# Patient Record
Sex: FEMALE | Race: AMERICAN INDIAN OR ALASKA NATIVE | NOT HISPANIC OR LATINO | URBAN - METROPOLITAN AREA
[De-identification: names, ages, dates, MRNs, and addresses within clinical notes are randomized per-mention and may not be internally consistent; named-entity substitution may affect disease eponyms.]

---

## 2017-07-10 ENCOUNTER — OUTPATIENT (OUTPATIENT)
Dept: OUTPATIENT SERVICES | Facility: HOSPITAL | Age: 77
LOS: 1 days | Discharge: HOME | End: 2017-07-10

## 2017-07-10 DIAGNOSIS — M25.519 PAIN IN UNSPECIFIED SHOULDER: ICD-10-CM

## 2017-07-10 DIAGNOSIS — K02.52 DENTAL CARIES ON PIT AND FISSURE SURFACE PENETRATING INTO DENTIN: ICD-10-CM

## 2017-07-10 DIAGNOSIS — E78.00 PURE HYPERCHOLESTEROLEMIA, UNSPECIFIED: ICD-10-CM

## 2017-09-06 ENCOUNTER — OUTPATIENT (OUTPATIENT)
Dept: OUTPATIENT SERVICES | Facility: HOSPITAL | Age: 77
LOS: 1 days | Discharge: HOME | End: 2017-09-06

## 2017-09-06 DIAGNOSIS — E78.00 PURE HYPERCHOLESTEROLEMIA, UNSPECIFIED: ICD-10-CM

## 2017-09-06 DIAGNOSIS — Z98.818 OTHER DENTAL PROCEDURE STATUS: ICD-10-CM

## 2017-09-06 DIAGNOSIS — M25.519 PAIN IN UNSPECIFIED SHOULDER: ICD-10-CM

## 2017-09-29 ENCOUNTER — OUTPATIENT (OUTPATIENT)
Dept: OUTPATIENT SERVICES | Facility: HOSPITAL | Age: 77
LOS: 1 days | Discharge: HOME | End: 2017-09-29

## 2017-09-29 DIAGNOSIS — E78.00 PURE HYPERCHOLESTEROLEMIA, UNSPECIFIED: ICD-10-CM

## 2017-09-29 DIAGNOSIS — M25.519 PAIN IN UNSPECIFIED SHOULDER: ICD-10-CM

## 2017-10-19 ENCOUNTER — OUTPATIENT (OUTPATIENT)
Dept: OUTPATIENT SERVICES | Facility: HOSPITAL | Age: 77
LOS: 1 days | Discharge: HOME | End: 2017-10-19

## 2017-10-19 DIAGNOSIS — E78.00 PURE HYPERCHOLESTEROLEMIA, UNSPECIFIED: ICD-10-CM

## 2017-10-19 DIAGNOSIS — Z01.21 ENCOUNTER FOR DENTAL EXAMINATION AND CLEANING WITH ABNORMAL FINDINGS: ICD-10-CM

## 2017-10-19 DIAGNOSIS — M25.519 PAIN IN UNSPECIFIED SHOULDER: ICD-10-CM

## 2017-10-23 ENCOUNTER — OUTPATIENT (OUTPATIENT)
Dept: OUTPATIENT SERVICES | Facility: HOSPITAL | Age: 77
LOS: 1 days | Discharge: HOME | End: 2017-10-23

## 2017-10-23 DIAGNOSIS — K05.4 PERIODONTOSIS: ICD-10-CM

## 2017-10-23 DIAGNOSIS — M25.519 PAIN IN UNSPECIFIED SHOULDER: ICD-10-CM

## 2017-10-23 DIAGNOSIS — E78.00 PURE HYPERCHOLESTEROLEMIA, UNSPECIFIED: ICD-10-CM

## 2017-11-01 ENCOUNTER — OUTPATIENT (OUTPATIENT)
Dept: OUTPATIENT SERVICES | Facility: HOSPITAL | Age: 77
LOS: 1 days | Discharge: HOME | End: 2017-11-01

## 2017-11-01 DIAGNOSIS — M25.519 PAIN IN UNSPECIFIED SHOULDER: ICD-10-CM

## 2017-11-01 DIAGNOSIS — R91.8 OTHER NONSPECIFIC ABNORMAL FINDING OF LUNG FIELD: ICD-10-CM

## 2017-11-01 DIAGNOSIS — E78.00 PURE HYPERCHOLESTEROLEMIA, UNSPECIFIED: ICD-10-CM

## 2018-03-10 ENCOUNTER — OUTPATIENT (OUTPATIENT)
Dept: OUTPATIENT SERVICES | Facility: HOSPITAL | Age: 78
LOS: 1 days | Discharge: HOME | End: 2018-03-10

## 2018-03-10 DIAGNOSIS — R91.8 OTHER NONSPECIFIC ABNORMAL FINDING OF LUNG FIELD: ICD-10-CM

## 2019-05-31 ENCOUNTER — OUTPATIENT (OUTPATIENT)
Dept: OUTPATIENT SERVICES | Facility: HOSPITAL | Age: 79
LOS: 1 days | Discharge: HOME | End: 2019-05-31
Payer: MEDICARE

## 2019-05-31 DIAGNOSIS — R13.10 DYSPHAGIA, UNSPECIFIED: ICD-10-CM

## 2019-05-31 PROCEDURE — 74247: CPT | Mod: 26

## 2019-06-21 ENCOUNTER — OUTPATIENT (OUTPATIENT)
Dept: OUTPATIENT SERVICES | Facility: HOSPITAL | Age: 79
LOS: 1 days | Discharge: HOME | End: 2019-06-21

## 2019-06-21 DIAGNOSIS — R13.10 DYSPHAGIA, UNSPECIFIED: ICD-10-CM

## 2019-08-02 ENCOUNTER — OUTPATIENT (OUTPATIENT)
Dept: OUTPATIENT SERVICES | Facility: HOSPITAL | Age: 79
LOS: 1 days | Discharge: HOME | End: 2019-08-02

## 2019-08-14 DIAGNOSIS — K02.52 DENTAL CARIES ON PIT AND FISSURE SURFACE PENETRATING INTO DENTIN: ICD-10-CM

## 2019-08-16 ENCOUNTER — OUTPATIENT (OUTPATIENT)
Dept: OUTPATIENT SERVICES | Facility: HOSPITAL | Age: 79
LOS: 1 days | Discharge: HOME | End: 2019-08-16
Payer: MEDICARE

## 2019-08-16 PROCEDURE — 92012 INTRM OPH EXAM EST PATIENT: CPT

## 2019-09-20 ENCOUNTER — OUTPATIENT (OUTPATIENT)
Dept: OUTPATIENT SERVICES | Facility: HOSPITAL | Age: 79
LOS: 1 days | Discharge: HOME | End: 2019-09-20
Payer: MEDICARE

## 2019-09-20 PROCEDURE — 92014 COMPRE OPH EXAM EST PT 1/>: CPT

## 2019-10-09 ENCOUNTER — OUTPATIENT (OUTPATIENT)
Dept: OUTPATIENT SERVICES | Facility: HOSPITAL | Age: 79
LOS: 1 days | Discharge: HOME | End: 2019-10-09

## 2019-10-09 ENCOUNTER — APPOINTMENT (OUTPATIENT)
Dept: ENDOCRINOLOGY | Facility: CLINIC | Age: 79
End: 2019-10-09

## 2019-10-09 VITALS
TEMPERATURE: 96.1 F | WEIGHT: 115 LBS | DIASTOLIC BLOOD PRESSURE: 80 MMHG | BODY MASS INDEX: 21.16 KG/M2 | HEIGHT: 62 IN | HEART RATE: 69 BPM | SYSTOLIC BLOOD PRESSURE: 155 MMHG

## 2019-10-09 DIAGNOSIS — Z78.9 OTHER SPECIFIED HEALTH STATUS: ICD-10-CM

## 2019-10-09 DIAGNOSIS — R63.4 ABNORMAL WEIGHT LOSS: ICD-10-CM

## 2019-10-09 NOTE — PHYSICAL EXAM
[Alert] : alert [No Acute Distress] : no acute distress [Well Nourished] : well nourished [Well Developed] : well developed [EOMI] : extra ocular movement intact [Normal Sclera/Conjunctiva] : normal sclera/conjunctiva [No Proptosis] : no proptosis [Normal Oropharynx] : the oropharynx was normal [Thyroid Not Enlarged] : the thyroid was not enlarged [No Thyroid Nodules] : there were no palpable thyroid nodules [Clear to Auscultation] : lungs were clear to auscultation bilaterally [No Accessory Muscle Use] : no accessory muscle use [No Respiratory Distress] : no respiratory distress [Normal S1, S2] : normal S1 and S2 [Regular Rhythm] : with a regular rhythm [Normal Rate] : heart rate was normal  [No Edema] : there was no peripheral edema [Pedal Pulses Normal] : the pedal pulses are present [Not Tender] : non-tender [Normal Bowel Sounds] : normal bowel sounds [Not Distended] : not distended [Soft] : abdomen soft [Post Cervical Nodes] : posterior cervical nodes [Normal] : normal and non tender [Anterior Cervical Nodes] : anterior cervical nodes [Axillary Nodes] : axillary nodes [Spine Straight] : spine straight [No Spinal Tenderness] : no spinal tenderness [Normal Gait] : normal gait [No Stigmata of Cushings Syndrome] : no stigmata of cushings syndrome [Normal Strength/Tone] : muscle strength and tone were normal [No Rash] : no rash [Acanthosis Nigricans] : no acanthosis nigricans [No Tremors] : no tremors [Normal Reflexes] : deep tendon reflexes were 2+ and symmetric [Oriented x3] : oriented to person, place, and time

## 2019-10-11 ENCOUNTER — OUTPATIENT (OUTPATIENT)
Dept: OUTPATIENT SERVICES | Facility: HOSPITAL | Age: 79
LOS: 1 days | Discharge: HOME | End: 2019-10-11
Payer: MEDICARE

## 2019-10-11 PROCEDURE — 92012 INTRM OPH EXAM EST PATIENT: CPT

## 2019-11-07 ENCOUNTER — OUTPATIENT (OUTPATIENT)
Dept: OUTPATIENT SERVICES | Facility: HOSPITAL | Age: 79
LOS: 1 days | Discharge: HOME | End: 2019-11-07

## 2019-11-07 DIAGNOSIS — Z98.818 OTHER DENTAL PROCEDURE STATUS: ICD-10-CM

## 2019-11-22 ENCOUNTER — OUTPATIENT (OUTPATIENT)
Dept: OUTPATIENT SERVICES | Facility: HOSPITAL | Age: 79
LOS: 1 days | Discharge: HOME | End: 2019-11-22
Payer: MEDICARE

## 2019-11-22 PROCEDURE — 92012 INTRM OPH EXAM EST PATIENT: CPT

## 2020-01-02 ENCOUNTER — OUTPATIENT (OUTPATIENT)
Dept: OUTPATIENT SERVICES | Facility: HOSPITAL | Age: 80
LOS: 1 days | Discharge: HOME | End: 2020-01-02
Payer: MEDICARE

## 2020-01-02 PROCEDURE — 92012 INTRM OPH EXAM EST PATIENT: CPT

## 2020-08-27 ENCOUNTER — OUTPATIENT (OUTPATIENT)
Dept: OUTPATIENT SERVICES | Facility: HOSPITAL | Age: 80
LOS: 1 days | Discharge: HOME | End: 2020-08-27
Payer: MEDICARE

## 2020-08-27 PROCEDURE — 92014 COMPRE OPH EXAM EST PT 1/>: CPT

## 2020-08-27 PROCEDURE — 92134 CPTRZ OPH DX IMG PST SGM RTA: CPT | Mod: 26

## 2020-08-31 DIAGNOSIS — H35.89 OTHER SPECIFIED RETINAL DISORDERS: ICD-10-CM

## 2020-08-31 DIAGNOSIS — H04.123 DRY EYE SYNDROME OF BILATERAL LACRIMAL GLANDS: ICD-10-CM

## 2020-08-31 DIAGNOSIS — H25.10 AGE-RELATED NUCLEAR CATARACT, UNSPECIFIED EYE: ICD-10-CM

## 2021-03-10 ENCOUNTER — FORM ENCOUNTER (OUTPATIENT)
Age: 81
End: 2021-03-10

## 2021-03-11 ENCOUNTER — TRANSCRIPTION ENCOUNTER (OUTPATIENT)
Age: 81
End: 2021-03-11

## 2021-03-12 ENCOUNTER — OUTPATIENT (OUTPATIENT)
Dept: INPATIENT UNIT | Facility: HOSPITAL | Age: 81
LOS: 1 days | Discharge: HOME | End: 2021-03-12

## 2021-03-12 ENCOUNTER — APPOINTMENT (OUTPATIENT)
Dept: DISASTER EMERGENCY | Facility: CLINIC | Age: 81
End: 2021-03-12

## 2021-03-12 VITALS
HEART RATE: 70 BPM | DIASTOLIC BLOOD PRESSURE: 70 MMHG | OXYGEN SATURATION: 98 % | SYSTOLIC BLOOD PRESSURE: 175 MMHG | TEMPERATURE: 98 F | RESPIRATION RATE: 19 BRPM

## 2021-03-12 VITALS
SYSTOLIC BLOOD PRESSURE: 145 MMHG | OXYGEN SATURATION: 98 % | DIASTOLIC BLOOD PRESSURE: 65 MMHG | TEMPERATURE: 99 F | RESPIRATION RATE: 19 BRPM | HEART RATE: 66 BPM

## 2021-03-12 DIAGNOSIS — U07.1 COVID-19: ICD-10-CM

## 2021-03-12 RX ORDER — SODIUM CHLORIDE 9 MG/ML
250 INJECTION INTRAMUSCULAR; INTRAVENOUS; SUBCUTANEOUS
Refills: 0 | Status: COMPLETED | OUTPATIENT
Start: 2021-03-12 | End: 2021-03-12

## 2021-03-12 RX ORDER — BAMLANIVIMAB 35 MG/ML
700 INJECTION, SOLUTION INTRAVENOUS ONCE
Refills: 0 | Status: COMPLETED | OUTPATIENT
Start: 2021-03-12 | End: 2021-03-12

## 2021-03-12 RX ADMIN — BAMLANIVIMAB 270 MILLIGRAM(S): 35 INJECTION, SOLUTION INTRAVENOUS at 09:00

## 2021-03-12 RX ADMIN — SODIUM CHLORIDE 25 MILLILITER(S): 9 INJECTION INTRAMUSCULAR; INTRAVENOUS; SUBCUTANEOUS at 11:04

## 2021-03-12 NOTE — CHART NOTE - NSCHARTNOTEFT_GEN_A_CORE
CC: Monoclonal Antibody Infusion/COVID 19 Positive  80yFemale    Pt is a 81 yo female, COVID + 3/7. Symptoms include loss of appetite, lower back pain, and chills.    PMH: HTN, HLD    MEDS: metoprolol, simvastatin, ASA 81, VIT C/D    ALLERGIES:  PCN    HT/WT: 5'1" 106 lbs    exam/findings:  T(C): --  HR: --  BP: --  RR: --  SpO2: --      PE:   Appearance: NAD	  HEENT:   Normal oral mucosa,   Lymphatic: No lymphadenopathy  Cardiovascular: Normal S1 S2, No JVD, No murmurs, No edema  Respiratory: Lungs clear to auscultation	  Gastrointestinal:  Soft, Non-tender, + BS	  Skin: warm and dry  Neurologic: Non-focal  Extremities: Normal range of motion,    ASSESSMENT:  Pt is a 81 yo female, Covid +3/7  referred by Dr. Farris who presents to infusion center for Monoclonal antibody infusion (Bamlanivimab)  Symptoms/ Criteria: loss of appetite, lower back pain, chills  Risk Profile includes: age over 65, HTN     PLAN:  - infusion procedure explained to patient   - Consent for monoclonal antibody infusion obtained   - Risk & benefits discussed/all questions answered   - infuse  Bamlanivimab 700mg  IV over one hour   - observe patient for one hour post infusion   - pt was discharged in stable condition.  - pt tolerated the procedure well.  - pt was instructed to continue quarantine and hand hygiene  - pt was instructed to go to the emergency department if they experience difficulty breathing, shortness of breath, fever over 100.4 and/or pulse ox under 94%    Jillian D'Amico, PA-C CC: Monoclonal Antibody Infusion/COVID 19 Positive  80yFemale    Pt is a 79 yo female, COVID + 3/7. Symptoms include loss of appetite, lower back pain, and chills.    PMH: HTN, HLD    MEDS: metoprolol, simvastatin, ASA 81, VIT C/D    ALLERGIES:  PCN    HT/WT: 5'1" 106 lbs    exam/findings:  Vital Signs Last 24 Hrs  T(C): 36.8 (12 Mar 2021 09:00), Max: 36.8 (12 Mar 2021 09:00)  T(F): 98.2 (12 Mar 2021 09:00), Max: 98.2 (12 Mar 2021 09:00)  HR: 70 (12 Mar 2021 09:00) (70 - 70)  BP: 175/70 (12 Mar 2021 09:00) (175/70 - 175/70)  BP(mean): --  RR: 19 (12 Mar 2021 09:00) (19 - 19)  SpO2: 98% (12 Mar 2021 09:00) (98% - 98%)    PE:   Appearance: NAD	  HEENT:   Normal oral mucosa,   Lymphatic: No lymphadenopathy  Cardiovascular: Normal S1 S2, No JVD, No murmurs, No edema  Respiratory: Lungs clear to auscultation	  Gastrointestinal:  Soft, Non-tender, + BS	  Skin: warm and dry  Neurologic: Non-focal  Extremities: Normal range of motion,    ASSESSMENT:  Pt is a 79 yo female, Covid +3/7  referred by Dr. Farris who presents to infusion center for Monoclonal antibody infusion (Bamlanivimab)  Symptoms/ Criteria: loss of appetite, lower back pain, chills  Risk Profile includes: age over 65, HTN     PLAN:  - infusion procedure explained to patient   - Consent for monoclonal antibody infusion obtained   - Risk & benefits discussed/all questions answered   - infuse  Bamlanivimab 700mg  IV over one hour   - observe patient for one hour post infusion   - pt was discharged in stable condition.  - pt tolerated the procedure well.  - pt was instructed to continue quarantine and hand hygiene  - pt was instructed to go to the emergency department if they experience difficulty breathing, shortness of breath, fever over 100.4 and/or pulse ox under 94%    Jillian D'Amico, PA-C

## 2021-03-13 ENCOUNTER — TRANSCRIPTION ENCOUNTER (OUTPATIENT)
Age: 81
End: 2021-03-13

## 2021-11-29 ENCOUNTER — APPOINTMENT (OUTPATIENT)
Dept: OPHTHALMOLOGY | Facility: CLINIC | Age: 81
End: 2021-11-29

## 2021-11-29 ENCOUNTER — OUTPATIENT (OUTPATIENT)
Dept: OUTPATIENT SERVICES | Facility: HOSPITAL | Age: 81
LOS: 1 days | Discharge: HOME | End: 2021-11-29
Payer: MEDICARE

## 2021-11-29 PROCEDURE — 92014 COMPRE OPH EXAM EST PT 1/>: CPT

## 2021-12-01 DIAGNOSIS — H35.89 OTHER SPECIFIED RETINAL DISORDERS: ICD-10-CM

## 2021-12-01 DIAGNOSIS — H25.10 AGE-RELATED NUCLEAR CATARACT, UNSPECIFIED EYE: ICD-10-CM

## 2021-12-01 DIAGNOSIS — H04.123 DRY EYE SYNDROME OF BILATERAL LACRIMAL GLANDS: ICD-10-CM

## 2022-01-20 ENCOUNTER — OUTPATIENT (OUTPATIENT)
Dept: OUTPATIENT SERVICES | Facility: HOSPITAL | Age: 82
LOS: 1 days | Discharge: HOME | End: 2022-01-20

## 2022-05-24 ENCOUNTER — OUTPATIENT (OUTPATIENT)
Dept: OUTPATIENT SERVICES | Facility: HOSPITAL | Age: 82
LOS: 1 days | Discharge: HOME | End: 2022-05-24
Payer: MEDICARE

## 2022-05-24 DIAGNOSIS — M54.16 RADICULOPATHY, LUMBAR REGION: ICD-10-CM

## 2022-05-24 DIAGNOSIS — M54.9 DORSALGIA, UNSPECIFIED: ICD-10-CM

## 2022-05-24 PROCEDURE — 72110 X-RAY EXAM L-2 SPINE 4/>VWS: CPT | Mod: 26

## 2022-07-06 ENCOUNTER — OUTPATIENT (OUTPATIENT)
Dept: OUTPATIENT SERVICES | Facility: HOSPITAL | Age: 82
LOS: 1 days | Discharge: HOME | End: 2022-07-06

## 2022-07-06 DIAGNOSIS — M54.89 OTHER DORSALGIA: ICD-10-CM

## 2022-07-06 DIAGNOSIS — M25.511 PAIN IN RIGHT SHOULDER: ICD-10-CM

## 2022-07-06 DIAGNOSIS — M54.16 RADICULOPATHY, LUMBAR REGION: ICD-10-CM

## 2022-07-14 ENCOUNTER — INPATIENT (INPATIENT)
Facility: HOSPITAL | Age: 82
LOS: 3 days | Discharge: HOME HEALTH | End: 2022-07-18
Attending: HOSPITALIST | Admitting: HOSPITALIST

## 2022-07-14 VITALS
TEMPERATURE: 98 F | OXYGEN SATURATION: 98 % | HEART RATE: 74 BPM | SYSTOLIC BLOOD PRESSURE: 169 MMHG | DIASTOLIC BLOOD PRESSURE: 79 MMHG | RESPIRATION RATE: 20 BRPM

## 2022-07-14 LAB
ALBUMIN SERPL ELPH-MCNC: 4.2 G/DL — SIGNIFICANT CHANGE UP (ref 3.5–5.2)
ALP SERPL-CCNC: 89 U/L — SIGNIFICANT CHANGE UP (ref 30–115)
ALT FLD-CCNC: 22 U/L — SIGNIFICANT CHANGE UP (ref 0–41)
ANION GAP SERPL CALC-SCNC: 11 MMOL/L — SIGNIFICANT CHANGE UP (ref 7–14)
ANION GAP SERPL CALC-SCNC: 13 MMOL/L — SIGNIFICANT CHANGE UP (ref 7–14)
ANION GAP SERPL CALC-SCNC: 13 MMOL/L — SIGNIFICANT CHANGE UP (ref 7–14)
APPEARANCE UR: CLEAR — SIGNIFICANT CHANGE UP
AST SERPL-CCNC: 25 U/L — SIGNIFICANT CHANGE UP (ref 0–41)
BASOPHILS # BLD AUTO: 0.03 K/UL — SIGNIFICANT CHANGE UP (ref 0–0.2)
BASOPHILS NFR BLD AUTO: 0.3 % — SIGNIFICANT CHANGE UP (ref 0–1)
BILIRUB SERPL-MCNC: 0.7 MG/DL — SIGNIFICANT CHANGE UP (ref 0.2–1.2)
BILIRUB UR-MCNC: NEGATIVE — SIGNIFICANT CHANGE UP
BUN SERPL-MCNC: 6 MG/DL — LOW (ref 10–20)
BUN SERPL-MCNC: 6 MG/DL — LOW (ref 10–20)
BUN SERPL-MCNC: 7 MG/DL — LOW (ref 10–20)
CALCIUM SERPL-MCNC: 8.6 MG/DL — SIGNIFICANT CHANGE UP (ref 8.5–10.1)
CALCIUM SERPL-MCNC: 9.1 MG/DL — SIGNIFICANT CHANGE UP (ref 8.5–10.1)
CALCIUM SERPL-MCNC: 9.1 MG/DL — SIGNIFICANT CHANGE UP (ref 8.5–10.1)
CHLORIDE SERPL-SCNC: 90 MMOL/L — LOW (ref 98–110)
CHLORIDE SERPL-SCNC: 93 MMOL/L — LOW (ref 98–110)
CHLORIDE SERPL-SCNC: 96 MMOL/L — LOW (ref 98–110)
CO2 SERPL-SCNC: 23 MMOL/L — SIGNIFICANT CHANGE UP (ref 17–32)
CO2 SERPL-SCNC: 24 MMOL/L — SIGNIFICANT CHANGE UP (ref 17–32)
CO2 SERPL-SCNC: 26 MMOL/L — SIGNIFICANT CHANGE UP (ref 17–32)
COLOR SPEC: COLORLESS — SIGNIFICANT CHANGE UP
CREAT SERPL-MCNC: <0.5 MG/DL — LOW (ref 0.7–1.5)
DIFF PNL FLD: NEGATIVE — SIGNIFICANT CHANGE UP
EGFR: 100 ML/MIN/1.73M2 — SIGNIFICANT CHANGE UP
EOSINOPHIL # BLD AUTO: 0.03 K/UL — SIGNIFICANT CHANGE UP (ref 0–0.7)
EOSINOPHIL NFR BLD AUTO: 0.3 % — SIGNIFICANT CHANGE UP (ref 0–8)
GLUCOSE SERPL-MCNC: 125 MG/DL — HIGH (ref 70–99)
GLUCOSE SERPL-MCNC: 145 MG/DL — HIGH (ref 70–99)
GLUCOSE SERPL-MCNC: 95 MG/DL — SIGNIFICANT CHANGE UP (ref 70–99)
GLUCOSE UR QL: NEGATIVE — SIGNIFICANT CHANGE UP
HCT VFR BLD CALC: 34.1 % — LOW (ref 37–47)
HGB BLD-MCNC: 11.9 G/DL — LOW (ref 12–16)
IMM GRANULOCYTES NFR BLD AUTO: 0.5 % — HIGH (ref 0.1–0.3)
KETONES UR-MCNC: NEGATIVE — SIGNIFICANT CHANGE UP
LEUKOCYTE ESTERASE UR-ACNC: NEGATIVE — SIGNIFICANT CHANGE UP
LYMPHOCYTES # BLD AUTO: 1.15 K/UL — LOW (ref 1.2–3.4)
LYMPHOCYTES # BLD AUTO: 12.6 % — LOW (ref 20.5–51.1)
MCHC RBC-ENTMCNC: 30.5 PG — SIGNIFICANT CHANGE UP (ref 27–31)
MCHC RBC-ENTMCNC: 34.9 G/DL — SIGNIFICANT CHANGE UP (ref 32–37)
MCV RBC AUTO: 87.4 FL — SIGNIFICANT CHANGE UP (ref 81–99)
MONOCYTES # BLD AUTO: 1 K/UL — HIGH (ref 0.1–0.6)
MONOCYTES NFR BLD AUTO: 10.9 % — HIGH (ref 1.7–9.3)
NEUTROPHILS # BLD AUTO: 6.88 K/UL — HIGH (ref 1.4–6.5)
NEUTROPHILS NFR BLD AUTO: 75.4 % — HIGH (ref 42.2–75.2)
NITRITE UR-MCNC: NEGATIVE — SIGNIFICANT CHANGE UP
NRBC # BLD: 0 /100 WBCS — SIGNIFICANT CHANGE UP (ref 0–0)
OSMOLALITY SERPL: 275 MOS/KG — LOW (ref 280–301)
PH UR: 8 — SIGNIFICANT CHANGE UP (ref 5–8)
PLATELET # BLD AUTO: 190 K/UL — SIGNIFICANT CHANGE UP (ref 130–400)
POTASSIUM SERPL-MCNC: 3.5 MMOL/L — SIGNIFICANT CHANGE UP (ref 3.5–5)
POTASSIUM SERPL-MCNC: 3.6 MMOL/L — SIGNIFICANT CHANGE UP (ref 3.5–5)
POTASSIUM SERPL-MCNC: 4.2 MMOL/L — SIGNIFICANT CHANGE UP (ref 3.5–5)
POTASSIUM SERPL-SCNC: 3.5 MMOL/L — SIGNIFICANT CHANGE UP (ref 3.5–5)
POTASSIUM SERPL-SCNC: 3.6 MMOL/L — SIGNIFICANT CHANGE UP (ref 3.5–5)
POTASSIUM SERPL-SCNC: 4.2 MMOL/L — SIGNIFICANT CHANGE UP (ref 3.5–5)
PROT SERPL-MCNC: 6.6 G/DL — SIGNIFICANT CHANGE UP (ref 6–8)
PROT UR-MCNC: SIGNIFICANT CHANGE UP
RBC # BLD: 3.9 M/UL — LOW (ref 4.2–5.4)
RBC # FLD: 13.4 % — SIGNIFICANT CHANGE UP (ref 11.5–14.5)
SARS-COV-2 RNA SPEC QL NAA+PROBE: SIGNIFICANT CHANGE UP
SODIUM SERPL-SCNC: 127 MMOL/L — LOW (ref 135–146)
SODIUM SERPL-SCNC: 129 MMOL/L — LOW (ref 135–146)
SODIUM SERPL-SCNC: 133 MMOL/L — LOW (ref 135–146)
SP GR SPEC: 1.01 — LOW (ref 1.01–1.03)
UROBILINOGEN FLD QL: SIGNIFICANT CHANGE UP
WBC # BLD: 9.14 K/UL — SIGNIFICANT CHANGE UP (ref 4.8–10.8)
WBC # FLD AUTO: 9.14 K/UL — SIGNIFICANT CHANGE UP (ref 4.8–10.8)

## 2022-07-14 PROCEDURE — 74177 CT ABD & PELVIS W/CONTRAST: CPT | Mod: 26,MA

## 2022-07-14 PROCEDURE — 93010 ELECTROCARDIOGRAM REPORT: CPT

## 2022-07-14 PROCEDURE — 99285 EMERGENCY DEPT VISIT HI MDM: CPT | Mod: FS

## 2022-07-14 PROCEDURE — 99222 1ST HOSP IP/OBS MODERATE 55: CPT

## 2022-07-14 PROCEDURE — 76770 US EXAM ABDO BACK WALL COMP: CPT | Mod: 26

## 2022-07-14 RX ORDER — KETOROLAC TROMETHAMINE 30 MG/ML
15 SYRINGE (ML) INJECTION ONCE
Refills: 0 | Status: DISCONTINUED | OUTPATIENT
Start: 2022-07-14 | End: 2022-07-14

## 2022-07-14 RX ORDER — ENOXAPARIN SODIUM 100 MG/ML
40 INJECTION SUBCUTANEOUS EVERY 24 HOURS
Refills: 0 | Status: DISCONTINUED | OUTPATIENT
Start: 2022-07-14 | End: 2022-07-18

## 2022-07-14 RX ORDER — GABAPENTIN 400 MG/1
200 CAPSULE ORAL THREE TIMES A DAY
Refills: 0 | Status: DISCONTINUED | OUTPATIENT
Start: 2022-07-14 | End: 2022-07-18

## 2022-07-14 RX ORDER — SENNA PLUS 8.6 MG/1
2 TABLET ORAL AT BEDTIME
Refills: 0 | Status: DISCONTINUED | OUTPATIENT
Start: 2022-07-14 | End: 2022-07-18

## 2022-07-14 RX ORDER — SODIUM CHLORIDE 9 MG/ML
1000 INJECTION INTRAMUSCULAR; INTRAVENOUS; SUBCUTANEOUS ONCE
Refills: 0 | Status: COMPLETED | OUTPATIENT
Start: 2022-07-14 | End: 2022-07-14

## 2022-07-14 RX ORDER — SIMVASTATIN 20 MG/1
1 TABLET, FILM COATED ORAL
Qty: 0 | Refills: 0 | DISCHARGE

## 2022-07-14 RX ORDER — PANTOPRAZOLE SODIUM 20 MG/1
40 TABLET, DELAYED RELEASE ORAL
Refills: 0 | Status: DISCONTINUED | OUTPATIENT
Start: 2022-07-14 | End: 2022-07-18

## 2022-07-14 RX ORDER — METOPROLOL TARTRATE 50 MG
25 TABLET ORAL DAILY
Refills: 0 | Status: DISCONTINUED | OUTPATIENT
Start: 2022-07-14 | End: 2022-07-18

## 2022-07-14 RX ORDER — ASPIRIN/CALCIUM CARB/MAGNESIUM 324 MG
1 TABLET ORAL
Qty: 0 | Refills: 0 | DISCHARGE

## 2022-07-14 RX ORDER — SIMVASTATIN 20 MG/1
10 TABLET, FILM COATED ORAL AT BEDTIME
Refills: 0 | Status: DISCONTINUED | OUTPATIENT
Start: 2022-07-14 | End: 2022-07-18

## 2022-07-14 RX ORDER — POLYETHYLENE GLYCOL 3350 17 G/17G
17 POWDER, FOR SOLUTION ORAL DAILY
Refills: 0 | Status: DISCONTINUED | OUTPATIENT
Start: 2022-07-14 | End: 2022-07-18

## 2022-07-14 RX ORDER — ASPIRIN/CALCIUM CARB/MAGNESIUM 324 MG
81 TABLET ORAL DAILY
Refills: 0 | Status: DISCONTINUED | OUTPATIENT
Start: 2022-07-14 | End: 2022-07-18

## 2022-07-14 RX ORDER — GABAPENTIN 400 MG/1
1 CAPSULE ORAL
Qty: 0 | Refills: 0 | DISCHARGE

## 2022-07-14 RX ORDER — LANOLIN ALCOHOL/MO/W.PET/CERES
3 CREAM (GRAM) TOPICAL AT BEDTIME
Refills: 0 | Status: DISCONTINUED | OUTPATIENT
Start: 2022-07-14 | End: 2022-07-18

## 2022-07-14 RX ORDER — METOPROLOL TARTRATE 50 MG
1 TABLET ORAL
Qty: 0 | Refills: 0 | DISCHARGE

## 2022-07-14 RX ORDER — GABAPENTIN 400 MG/1
100 CAPSULE ORAL THREE TIMES A DAY
Refills: 0 | Status: DISCONTINUED | OUTPATIENT
Start: 2022-07-14 | End: 2022-07-14

## 2022-07-14 RX ORDER — IBUPROFEN 200 MG
200 TABLET ORAL EVERY 6 HOURS
Refills: 0 | Status: DISCONTINUED | OUTPATIENT
Start: 2022-07-14 | End: 2022-07-18

## 2022-07-14 RX ORDER — OXYCODONE HYDROCHLORIDE 5 MG/1
5 TABLET ORAL EVERY 6 HOURS
Refills: 0 | Status: DISCONTINUED | OUTPATIENT
Start: 2022-07-14 | End: 2022-07-18

## 2022-07-14 RX ORDER — ACETAMINOPHEN 500 MG
650 TABLET ORAL EVERY 6 HOURS
Refills: 0 | Status: DISCONTINUED | OUTPATIENT
Start: 2022-07-14 | End: 2022-07-18

## 2022-07-14 RX ADMIN — Medication 200 MILLIGRAM(S): at 19:16

## 2022-07-14 RX ADMIN — SODIUM CHLORIDE 1000 MILLILITER(S): 9 INJECTION INTRAMUSCULAR; INTRAVENOUS; SUBCUTANEOUS at 12:03

## 2022-07-14 RX ADMIN — Medication 15 MILLIGRAM(S): at 12:03

## 2022-07-14 RX ADMIN — Medication 650 MILLIGRAM(S): at 19:16

## 2022-07-14 RX ADMIN — Medication 81 MILLIGRAM(S): at 23:50

## 2022-07-14 RX ADMIN — Medication 200 MILLIGRAM(S): at 23:49

## 2022-07-14 RX ADMIN — ENOXAPARIN SODIUM 40 MILLIGRAM(S): 100 INJECTION SUBCUTANEOUS at 19:16

## 2022-07-14 RX ADMIN — Medication 650 MILLIGRAM(S): at 23:49

## 2022-07-14 RX ADMIN — SENNA PLUS 2 TABLET(S): 8.6 TABLET ORAL at 23:50

## 2022-07-14 RX ADMIN — GABAPENTIN 200 MILLIGRAM(S): 400 CAPSULE ORAL at 23:49

## 2022-07-14 RX ADMIN — SIMVASTATIN 10 MILLIGRAM(S): 20 TABLET, FILM COATED ORAL at 23:50

## 2022-07-14 NOTE — ED PROVIDER NOTE - ATTENDING APP SHARED VISIT CONTRIBUTION OF CARE
pw back pain, suprapubic burning. Prior hx of back pain. Worsening x days and now too much pain to comfortably walk. No neuro deficits.   Pain control. imaging. UA for possible UTI. Reassess.

## 2022-07-14 NOTE — H&P ADULT - HISTORY OF PRESENT ILLNESS
81 year old F from home with hx of htn, hld, chronic back pain c/o b/l lower back pain R> L x 1 month. Sts was started on gabapentin x 1 month ago for pain with minimal relief. Pain has been worsening for the past 5 days. Pain is worse with movement and better with rest. Pt normally ambulates with walker but has not been able to walk today due to the pain. + constipation sts last bowel movement x 3-4 days ago and is passing gas. + dysuria x 3 days. Denies any trauma or injuries, numbness, bowel/bladder incontinence, LE weakness, abd pain, fever/chills, nausea, vomiting, hematuria, cp, sob.     pw back pain, suprapubic burning. Prior hx of back pain. Worsening x days and now too much pain to comfortably walk. No neuro deficits.   Pain control. imaging. UA for possible UTI. Reassess.   82 YO F w/Pmhx of HTN, HLD and Chronic back pain(following with neurologist, Dr. Wolfe), presented for severe back pain. At baseline, patient uses a walker and cane for ambulation.    3 months ago patient started having lower back pain for which she saw neurologist who recommended physical therapy, patient did 4 weeks of physical therapy which did not help her back pain. 4 days ago lower back pain got worse, 9/10 intensity, dull/burning quality, radiating down the right leg.    81 year old F from home with hx of htn, hld, chronic back pain c/o b/l lower back pain R> L x 1 month. Sts was started on gabapentin x 1 month ago for pain with minimal relief. Pain has been worsening for the past 5 days. Pain is worse with movement and better with rest. Pt normally ambulates with walker but has not been able to walk today due to the pain. + constipation sts last bowel movement x 3-4 days ago and is passing gas. + dysuria x 3 days. Denies any trauma or injuries, numbness, bowel/bladder incontinence, LE weakness, abd pain, fever/chills, nausea, vomiting, hematuria, cp, sob.     pw back pain, suprapubic burning. Prior hx of back pain. Worsening x days and now too much pain to comfortably walk. No neuro deficits.   Pain control. imaging. UA for possible UTI. Reassess.   82 YO F w/Pmhx of HTN, HLD and Chronic back pain(following with neurologist, Dr. Wolfe), presented for severe back pain. At baseline, patient uses a walker and cane for ambulation.    3 months ago patient started having lower back pain for which she saw neurologist who recommended physical therapy and gabapentin, patient did 4 weeks of physical therapy which did not help her back pain. 4 days ago lower back pain got worse, pain worse on right lower back, 8/10 intensity, dull/burning quality, radiating down the right leg. No associated urinary or fecal incontinence, lower leg weakness or numbness. Denies HA, dizziness, NVD, fever, SOB, chest pain, abdominal pain, change in urination and change in bowel habits.     In the ED, pain was relatively controlled with ketorolac.    Vital Signs Last 24 Hrs:  T(F): 98.3 (14 Jul 2022 11:18), Max: 98.3 (14 Jul 2022 11:18)  HR: 74 (14 Jul 2022 11:18) (74 - 74)  BP: 169/79 (14 Jul 2022 11:18) (169/79 - 169/79)  RR: 20 (14 Jul 2022 11:18) (20 - 20)  SpO2: 98% (14 Jul 2022 11:18) (98% - 98%)

## 2022-07-14 NOTE — PATIENT PROFILE ADULT - FALL HARM RISK - HARM RISK INTERVENTIONS

## 2022-07-14 NOTE — ED PROVIDER NOTE - PHYSICAL EXAMINATION
CONSTITUTIONAL: Well-appearing; well-nourished; in no apparent distress.   EYES: PERRL; EOM intact.   ENT: normal nose; no rhinorrhea; normal pharynx with no tonsillar hypertrophy.   NECK: Supple; non-tender; no cervical lymphadenopathy.   CARDIOVASCULAR: Normal S1, S2; no murmurs, rubs, or gallops.   RESPIRATORY: Normal chest excursion with respiration; breath sounds clear and equal bilaterally; no wheezes, rhonchi, or rales.  GI/: Normal bowel sounds; non-distended; non-tender; no palpable organomegaly.   MS: No evidence of trauma or deformity. no midline spinal ttp. Stable pelvis. + ttp to b/l lower lumbar paraspinal region R> L. Normal ROM in all four extremities;  distal pulses are normal.   SKIN: Normal for age and race; warm; dry; good turgor; no apparent lesions or exudate.   NEURO/PSYCH: A & O x 4; grossly unremarkable. mood and manner are appropriate. no focal deficits. Sensation intact. Strength equal b/l 5/5 throughout.

## 2022-07-14 NOTE — H&P ADULT - NSHPLABSRESULTS_GEN_ALL_CORE
11.9   9.14  )-----------( 190      ( 14 Jul 2022 12:10 )             34.1     07-14    127<L>  |  90<L>  |  7<L>  ----------------------------<  95  4.2   |  24  |  <0.5<L>    Ca    9.1      14 Jul 2022 12:10    TPro  6.6  /  Alb  4.2  /  TBili  0.7  /  DBili  x   /  AST  25  /  ALT  22  /  AlkPhos  89  07-14

## 2022-07-14 NOTE — H&P ADULT - NSHPPHYSICALEXAM_GEN_ALL_CORE
GENERAL: NAD, lying in bed comfortably, pain on movement  HEAD:  Atraumatic, Normocephalic  EYES: EOMI, PERRLA, conjunctiva and sclera clear  ENT: Moist mucous membranes  NECK: Supple, No JVD  CHEST/LUNG: Clear to auscultation bilaterally; No rales, rhonchi, wheezing, or rubs. Unlabored respirations  HEART: Regular rate and rhythm  ABDOMEN: Bowel sounds present; Soft, Nondistended, mild lower abdominal tenderness  EXTREMITIES:  2+ Peripheral Pulses, brisk capillary refill. No clubbing, cyanosis, or edema  NERVOUS SYSTEM:  Alert & Oriented X3, speech clear. No deficits     Paraspinal tenderness +ve, lower back pain with movement +ve

## 2022-07-14 NOTE — ED PROVIDER NOTE - OBJECTIVE STATEMENT
81 year old F from home with hx of htn, hld, chronic back pain c/o b/l lower back pain R> L x 1 month. Sts was started on gabapentin x 1 month ago for pain with minimal relief. Pain has been worsening for the past 5 days. Pain is worse with movement and better with rest. Pt normally ambulates with walker but has not been able to walk today due to the pain. + constipation sts last bowel movement x 3-4 days ago and is passing gas. + dysuria x 3 days. Denies any trauma or injuries, numbness, bowel/bladder incontinence, LE weakness, abd pain, fever/chills, nausea, vomiting, hematuria, cp, sob.

## 2022-07-14 NOTE — H&P ADULT - ASSESSMENT
#Lower back pain      #Urinary retention      #Hyponatremia      #Dysuria  - UA -ve      #HTN      #HLD      #Misc  -DVT prophylaxis: Lovenox  -GI prophylaxis: Protonix  -Diet: DASH  -Code status: Full code  -Activity: IAT  -Dispo: Pending PT    -Med rec confirmed with patient 82 YO F w/Pmhx of HTN, HLD and Chronic back pain(following with neurologist, Dr. Wolfe), presented for severe back pain. At baseline, patient uses a walker and cane for ambulation.    #Worsening chronic lower back pain 2/2 paraspinal muscle spasm?  -No incontinence, weakness or numbness  -Pain control: Tylenol 650mg q6, Motrin 200mg q6hrs, Gabapentin 200mg TID, Oxycodone 5mg q6 PRN  -PT/OT eval  -Neurology consult    #Concern Urinary retention  - Mild hypogastrium tenderness, patient endorsed retention to ED staff, although no retention endorsed to me.  - Will straight cath, if retaining will place a vences  - F/u RBUS    #Hyponatremia  -Na 127 on admission, no headache, no altered consciousness on admission. No baseline.  -Send Serum Osm, Urine lytes  -Monitor, if trending down, consult nephrology    #HTN  - c/w Metoprolol    #HLD  - c/w Statin    #Misc  -DVT prophylaxis: Lovenox  -GI prophylaxis: Protonix  -Diet: DASH  -Code status: Full code  -Activity: IAT  -Dispo: Pending PT    -Med rec confirmed with patient 82 YO F w/Pmhx of HTN, HLD and Chronic back pain(following with neurologist, Dr. Wolfe), presented for severe back pain. At baseline, patient uses a walker and cane for ambulation.    #Worsening chronic lower back pain 2/2 paraspinal muscle spasm?  #Chronic L3 compression fracture  -CT A&P shows possibly chronic L3 vertebral fracture and urinary bladder distention  -No incontinence, weakness or numbness  -Pain control: Tylenol 650mg q6, Motrin 200mg q6hrs, Gabapentin 200mg TID, Oxycodone 5mg q6 PRN  -PT/OT eval  -Neurology consult    #Concern Urinary retention  - Mild hypogastrium tenderness, concern for retention on CT A&P  - Will straight cath, if retaining will place a vences  - F/u RBUS    #Hyponatremia  -Na 127 on admission, no headache, no altered consciousness on admission. No baseline.  -Send Serum Osm, Urine lytes  -Monitor, if trending down, consult nephrology    #HTN  - c/w Metoprolol    #HLD  - c/w Statin    #Misc  -DVT prophylaxis: Lovenox  -GI prophylaxis: Protonix  -Diet: DASH  -Code status: Full code  -Activity: IAT  -Dispo: Pending PT    -Med rec confirmed with patient

## 2022-07-14 NOTE — PATIENT PROFILE ADULT - FUNCTIONAL ASSESSMENT - BASIC MOBILITY 6.
2 = A lot of assistance
60 year old man with hx of CAD s/p CABG/PCI (6/16),  on HD (T TH S) presents with ESRD.

## 2022-07-14 NOTE — ED PROVIDER NOTE - NS ED ROS FT
Constitutional: no fever, chills, no recent weight loss, change in appetite or malaise  Eyes: no redness/discharge/pain/vision changes  ENT: no rhinorrhea/ear pain/sore throat  Cardiac: No chest pain, SOB or edema.  Respiratory: No cough or respiratory distress  GI: + constipation. No nausea, vomiting, diarrhea or abdominal pain.  : + dysuria. No hematuria  MS: + b/l lower back pain R> L.  no loss of ROM, no weakness  Neuro: No headache or weakness. No LOC.  Skin: No skin rash.  Endocrine: No history of thyroid disease or diabetes.  Except as documented in the HPI, all other systems are negative.

## 2022-07-14 NOTE — CONSULT NOTE ADULT - ASSESSMENT
82 YO F w/Pmhx of HTN, HLD and Chronic back pain(following with neurologist, Dr. Wolfe), presented for severe back pain also found to have urinary retention. CT abdomen revealed L3 compression. Back pain likely multifactorial: patient likely has hip pathology in addition to injury of the spine at possibly thoracic or lumbar level. Urinary retention may be neurologic vs consequence of pain on walking to restroom.    Recommendations:    - Pain control   - Cervical and thoracic non-con  - Post-void resudual  - CT Right hip with contrast 81F Pmhx of HTN, HLD and Chronic back pain(following with neurologist, Dr. Wolfe), presented for 3 months back pain acutely severe back pain for 5 days also found to have urinary retention. CT abdomen revealed L3 compression. Back pain likely multifactorial: patient likely has hip pathology in addition to injury of the spine or cord. Urinary retention may be neurologic vs consequence of pain on walking to restroom.    Recommendations:  - Pain control per primary  - Cervical and thoracic MRI non-con  - CT Right hip with contrast  - Post-void residual  - Rest of mgmt per primary    Plan discussed with attending

## 2022-07-14 NOTE — ED ADULT NURSE NOTE - NSSEPSISSUSPECTED_ED_A_ED
[Well Nourished] : well nourished [Well Developed] : well developed [No Acute Distress] : no acute distress [Normal Sclera/Conjunctiva] : normal sclera/conjunctiva [Well-Appearing] : well-appearing [EOMI] : extraocular movements intact [PERRL] : pupils equal round and reactive to light [Normal Oropharynx] : the oropharynx was normal [Normal Outer Ear/Nose] : the outer ears and nose were normal in appearance [No JVD] : no jugular venous distention [No Lymphadenopathy] : no lymphadenopathy [Supple] : supple [Thyroid Normal, No Nodules] : the thyroid was normal and there were no nodules present [No Respiratory Distress] : no respiratory distress  No [No Accessory Muscle Use] : no accessory muscle use [Clear to Auscultation] : lungs were clear to auscultation bilaterally [Normal Rate] : normal rate  [Regular Rhythm] : with a regular rhythm [Normal S1, S2] : normal S1 and S2 [No Murmur] : no murmur heard [No Carotid Bruits] : no carotid bruits [No Abdominal Bruit] : a ~M bruit was not heard ~T in the abdomen [No Varicosities] : no varicosities [Pedal Pulses Present] : the pedal pulses are present [No Palpable Aorta] : no palpable aorta [No Edema] : there was no peripheral edema [No Extremity Clubbing/Cyanosis] : no extremity clubbing/cyanosis [Soft] : abdomen soft [Non Tender] : non-tender [No Masses] : no abdominal mass palpated [Non-distended] : non-distended [No HSM] : no HSM [Normal Bowel Sounds] : normal bowel sounds [Normal Posterior Cervical Nodes] : no posterior cervical lymphadenopathy [No CVA Tenderness] : no CVA  tenderness [Normal Anterior Cervical Nodes] : no anterior cervical lymphadenopathy [No Spinal Tenderness] : no spinal tenderness [Grossly Normal Strength/Tone] : grossly normal strength/tone [No Joint Swelling] : no joint swelling [No Rash] : no rash [Coordination Grossly Intact] : coordination grossly intact [No Focal Deficits] : no focal deficits [Normal Affect] : the affect was normal [Deep Tendon Reflexes (DTR)] : deep tendon reflexes were 2+ and symmetric [Normal Gait] : normal gait [Normal Insight/Judgement] : insight and judgment were intact

## 2022-07-14 NOTE — CONSULT NOTE ADULT - SUBJECTIVE AND OBJECTIVE BOX
NEUROLOGY CONSULT    HPI:  80 YO F w/Pmhx of HTN, HLD and Chronic back pain(following with neurologist, Dr. Wolfe), presented for severe back pain. At baseline, patient uses a walker and cane for ambulation.  3 months ago patient started having lower back pain for which she saw neurologist who recommended physical therapy and gabapentin, patient did 4 weeks of physical therapy which did not help her back pain. 4 days ago lower back pain got worse, pain worse on right lower back, 8/10 intensity, dull/burning quality, radiating down the right leg. No associated urinary or fecal incontinence, lower leg weakness or numbness. Denies HA, dizziness, NVD, fever, SOB, chest pain, abdominal pain, change in urination and change in bowel habits.   In the ED, pain was relatively controlled with ketorolac.    Subjective/Additional HPI: Denied saddle anesthesia. Endorsed burning pain on right side that sometimes travels down lateral leg. Cannot think of an inciting event for pain e.g. lifting something heavy. Denied urinary or stool incontinence, and episodes of urinary retention. Endorsed numbness of right toes. Has limited walking 2/2 pain. Denied fever, rash. Does not take steroids, antidepressants or anticholinergics.     Vital Signs Last 24 Hrs:  T(F): 98.3 (2022 11:18), Max: 98.3 (2022 11:18)  HR: 74 (2022 11:18) (74 - 74)  BP: 169/79 (2022 11:18) (169/79 - 169/79)  RR: 20 (2022 11:18) (20 - 20)  SpO2: 98% (2022 11:18) (98% - 98%)       (2022 15:23)     MEDICATIONS  Home Medications:  aspirin 81 mg oral tablet: 1 tab(s) orally once a day (2022 16:20)  gabapentin 100 mg oral capsule: 1 cap(s) orally 3 times a day (2022 16:20)  metoprolol succinate 25 mg oral tablet, extended release: 1 tab(s) orally once a day (2022 16:20)  simvastatin 10 mg oral tablet: 1 tab(s) orally once a day (at bedtime) (2022 16:20)    MEDICATIONS  (STANDING):  aspirin  chewable 81 milliGRAM(s) Oral daily  gabapentin 100 milliGRAM(s) Oral three times a day  metoprolol succinate ER 25 milliGRAM(s) Oral daily  simvastatin 10 milliGRAM(s) Oral at bedtime    MEDICATIONS  (PRN):      FAMILY HISTORY:    SOCIAL HISTORY: negative for tobacco, alcohol, or ilicit drug use.    Allergies    penicillins (Other (U))    Intolerances      GENERAL: NAD, Pleasant, cooperative, flank tenderness, TTP at right hip with pain on external rotation. No step-off or midline spinal tenderness.  NEURO:   MENTAL STATUS: AAOx3  LANG/SPEECH: Fluent, intact naming, repetition & comprehension  CRANIAL NERVES:  II: Pupils equal and reactive, no RAPD, normal visual field and fundus  III, IV, VI: EOM intact, no gaze preference or deviation  V: normal  VII: no facial asymmetry  VIII: normal hearing to speech  MOTOR: 5/5 UE b/l. 4+RLE with outward drift  REFLEXES: 2+ L Patellar 3+ R Patellar with cross adduction. Right downgoing toe  SENSORY: Normal to touch, temperature  COORD: Normal finger to nose.  GAIT: Deferred    LABS:                        11.9   9.14  )-----------( 190      ( 2022 12:10 )             34.1     07-14    127<L>  |  90<L>  |  7<L>  ----------------------------<  95  4.2   |  24  |  <0.5<L>    Ca    9.1      2022 12:10    TPro  6.6  /  Alb  4.2  /  TBili  0.7  /  DBili  x   /  AST  25  /  ALT  22  /  AlkPhos  89  07-14    Hemoglobin A1C:   Vitamin B12     CAPILLARY BLOOD GLUCOSE          Urinalysis Basic - ( 2022 12:20 )    Color: Colorless / Appearance: Clear / S.007 / pH: x  Gluc: x / Ketone: Negative  / Bili: Negative / Urobili: <2 mg/dL   Blood: x / Protein: Trace / Nitrite: Negative   Leuk Esterase: Negative / RBC: x / WBC x   Sq Epi: x / Non Sq Epi: x / Bacteria: x            Microbiology:      RADIOLOGY, EKG AND ADDITIONAL TESTS: Reviewed.    < from: CT Abdomen and Pelvis w/ IV Cont (22 @ 14:01) >  IMPRESSION:    Markedly distended urinary bladder with mild bilateral hydroureter.    Mild to moderate compression deformity L3 vertebral body, indeterminate   chronicity.    --- End of Report ---      < end of copied text >             NEUROLOGY CONSULT    HPI:  80 YO F w/Pmhx of HTN, HLD and Chronic back pain(following with neurologist, Dr. Wolfe), presented for severe back pain. At baseline, patient uses a walker and cane for ambulation.  3 months ago patient started having lower back pain for which she saw neurologist who recommended physical therapy and gabapentin, patient did 4 weeks of physical therapy which did not help her back pain. 4 days ago lower back pain got worse, pain worse on right lower back, 8/10 intensity, dull/burning quality, radiating down the right leg. No associated urinary or fecal incontinence, lower leg weakness or numbness. Denies HA, dizziness, NVD, fever, SOB, chest pain, abdominal pain, change in urination and change in bowel habits.   In the ED, pain was relatively controlled with ketorolac.    Subjective/Additional HPI: Denied saddle anesthesia. Endorsed burning pain on right side that sometimes travels down lateral leg. Cannot think of an inciting event for pain e.g. lifting something heavy. Denied urinary or stool incontinence, and episodes of urinary retention. Endorsed numbness of right toes. Has limited walking 2/2 pain. Denied fever, rash. Does not take steroids, antidepressants or anticholinergics.     Vital Signs Last 24 Hrs:  T(F): 98.3 (2022 11:18), Max: 98.3 (2022 11:18)  HR: 74 (2022 11:18) (74 - 74)  BP: 169/79 (2022 11:18) (169/79 - 169/79)  RR: 20 (2022 11:18) (20 - 20)  SpO2: 98% (2022 11:18) (98% - 98%)       (2022 15:23)     MEDICATIONS  Home Medications:  aspirin 81 mg oral tablet: 1 tab(s) orally once a day (2022 16:20)  gabapentin 100 mg oral capsule: 1 cap(s) orally 3 times a day (2022 16:20)  metoprolol succinate 25 mg oral tablet, extended release: 1 tab(s) orally once a day (2022 16:20)  simvastatin 10 mg oral tablet: 1 tab(s) orally once a day (at bedtime) (2022 16:20)    MEDICATIONS  (STANDING):  aspirin  chewable 81 milliGRAM(s) Oral daily  gabapentin 100 milliGRAM(s) Oral three times a day  metoprolol succinate ER 25 milliGRAM(s) Oral daily  simvastatin 10 milliGRAM(s) Oral at bedtime    MEDICATIONS  (PRN):      FAMILY HISTORY:    SOCIAL HISTORY: negative for tobacco, alcohol, or ilicit drug use.    Allergies    penicillins (Other (U))    Intolerances      GENERAL: NAD, Pleasant, cooperative, flank tenderness, TTP at right hip with pain on external rotation. No step-off or midline spinal tenderness.  NEURO:   MENTAL STATUS: AAOx3  LANG/SPEECH: Fluent, intact naming, repetition & comprehension  CRANIAL NERVES:  II: Pupils equal and reactive, no RAPD, normal visual field and fundus  III, IV, VI: EOM intact, no gaze preference or deviation  V: normal  VII: no facial asymmetry  VIII: normal hearing to speech  MOTOR: 5/5 UE b/l. 4+RLE with outward drift  REFLEXES: 2+ L Patellar 3+ R Patellar with cross adduction. Right downgoing toe  SENSORY: Normal to touch, temperature  COORD: Normal finger to nose.  GAIT: Deferred  TTP R hip worse with internal rotation  no TTP midline or paraspinals  no flank pain    LABS:                        11.9   9.14  )-----------( 190      ( 2022 12:10 )             34.1     07-14    127<L>  |  90<L>  |  7<L>  ----------------------------<  95  4.2   |  24  |  <0.5<L>    Ca    9.1      2022 12:10    TPro  6.6  /  Alb  4.2  /  TBili  0.7  /  DBili  x   /  AST  25  /  ALT  22  /  AlkPhos  89  07-14    Hemoglobin A1C:   Vitamin B12     CAPILLARY BLOOD GLUCOSE          Urinalysis Basic - ( 2022 12:20 )    Color: Colorless / Appearance: Clear / S.007 / pH: x  Gluc: x / Ketone: Negative  / Bili: Negative / Urobili: <2 mg/dL   Blood: x / Protein: Trace / Nitrite: Negative   Leuk Esterase: Negative / RBC: x / WBC x   Sq Epi: x / Non Sq Epi: x / Bacteria: x            Microbiology:      RADIOLOGY, EKG AND ADDITIONAL TESTS: Reviewed.    < from: CT Abdomen and Pelvis w/ IV Cont (22 @ 14:01) >  IMPRESSION:    Markedly distended urinary bladder with mild bilateral hydroureter.    Mild to moderate compression deformity L3 vertebral body, indeterminate   chronicity.    --- End of Report ---      < end of copied text >

## 2022-07-15 LAB
A1C WITH ESTIMATED AVERAGE GLUCOSE RESULT: 5.3 % — SIGNIFICANT CHANGE UP (ref 4–5.6)
ALBUMIN SERPL ELPH-MCNC: 3.7 G/DL — SIGNIFICANT CHANGE UP (ref 3.5–5.2)
ALP SERPL-CCNC: 80 U/L — SIGNIFICANT CHANGE UP (ref 30–115)
ALT FLD-CCNC: 19 U/L — SIGNIFICANT CHANGE UP (ref 0–41)
ANION GAP SERPL CALC-SCNC: 11 MMOL/L — SIGNIFICANT CHANGE UP (ref 7–14)
ANION GAP SERPL CALC-SCNC: 9 MMOL/L — SIGNIFICANT CHANGE UP (ref 7–14)
APTT BLD: 30.9 SEC — SIGNIFICANT CHANGE UP (ref 27–39.2)
AST SERPL-CCNC: 19 U/L — SIGNIFICANT CHANGE UP (ref 0–41)
BASOPHILS # BLD AUTO: 0.03 K/UL — SIGNIFICANT CHANGE UP (ref 0–0.2)
BASOPHILS NFR BLD AUTO: 0.6 % — SIGNIFICANT CHANGE UP (ref 0–1)
BILIRUB SERPL-MCNC: 0.8 MG/DL — SIGNIFICANT CHANGE UP (ref 0.2–1.2)
BUN SERPL-MCNC: 16 MG/DL — SIGNIFICANT CHANGE UP (ref 10–20)
BUN SERPL-MCNC: 8 MG/DL — LOW (ref 10–20)
CALCIUM SERPL-MCNC: 8.7 MG/DL — SIGNIFICANT CHANGE UP (ref 8.5–10.1)
CALCIUM SERPL-MCNC: 8.8 MG/DL — SIGNIFICANT CHANGE UP (ref 8.5–10.1)
CHLORIDE SERPL-SCNC: 95 MMOL/L — LOW (ref 98–110)
CHLORIDE SERPL-SCNC: 99 MMOL/L — SIGNIFICANT CHANGE UP (ref 98–110)
CHOLEST SERPL-MCNC: 147 MG/DL — SIGNIFICANT CHANGE UP
CO2 SERPL-SCNC: 25 MMOL/L — SIGNIFICANT CHANGE UP (ref 17–32)
CO2 SERPL-SCNC: 27 MMOL/L — SIGNIFICANT CHANGE UP (ref 17–32)
CREAT SERPL-MCNC: 0.5 MG/DL — LOW (ref 0.7–1.5)
CREAT SERPL-MCNC: <0.5 MG/DL — LOW (ref 0.7–1.5)
EGFR: 100 ML/MIN/1.73M2 — SIGNIFICANT CHANGE UP
EGFR: 95 ML/MIN/1.73M2 — SIGNIFICANT CHANGE UP
EOSINOPHIL # BLD AUTO: 0.09 K/UL — SIGNIFICANT CHANGE UP (ref 0–0.7)
EOSINOPHIL NFR BLD AUTO: 1.7 % — SIGNIFICANT CHANGE UP (ref 0–8)
ESTIMATED AVERAGE GLUCOSE: 105 MG/DL — SIGNIFICANT CHANGE UP (ref 68–114)
GLUCOSE SERPL-MCNC: 124 MG/DL — HIGH (ref 70–99)
GLUCOSE SERPL-MCNC: 87 MG/DL — SIGNIFICANT CHANGE UP (ref 70–99)
HCT VFR BLD CALC: 35.1 % — LOW (ref 37–47)
HDLC SERPL-MCNC: 71 MG/DL — SIGNIFICANT CHANGE UP
HGB BLD-MCNC: 12.1 G/DL — SIGNIFICANT CHANGE UP (ref 12–16)
IMM GRANULOCYTES NFR BLD AUTO: 0.4 % — HIGH (ref 0.1–0.3)
INR BLD: 1.16 RATIO — SIGNIFICANT CHANGE UP (ref 0.65–1.3)
LIPID PNL WITH DIRECT LDL SERPL: 64 MG/DL — SIGNIFICANT CHANGE UP
LYMPHOCYTES # BLD AUTO: 1.17 K/UL — LOW (ref 1.2–3.4)
LYMPHOCYTES # BLD AUTO: 22.1 % — SIGNIFICANT CHANGE UP (ref 20.5–51.1)
MCHC RBC-ENTMCNC: 30.7 PG — SIGNIFICANT CHANGE UP (ref 27–31)
MCHC RBC-ENTMCNC: 34.5 G/DL — SIGNIFICANT CHANGE UP (ref 32–37)
MCV RBC AUTO: 89.1 FL — SIGNIFICANT CHANGE UP (ref 81–99)
MONOCYTES # BLD AUTO: 0.49 K/UL — SIGNIFICANT CHANGE UP (ref 0.1–0.6)
MONOCYTES NFR BLD AUTO: 9.2 % — SIGNIFICANT CHANGE UP (ref 1.7–9.3)
NEUTROPHILS # BLD AUTO: 3.5 K/UL — SIGNIFICANT CHANGE UP (ref 1.4–6.5)
NEUTROPHILS NFR BLD AUTO: 66 % — SIGNIFICANT CHANGE UP (ref 42.2–75.2)
NON HDL CHOLESTEROL: 76 MG/DL — SIGNIFICANT CHANGE UP
NRBC # BLD: 0 /100 WBCS — SIGNIFICANT CHANGE UP (ref 0–0)
PLATELET # BLD AUTO: 213 K/UL — SIGNIFICANT CHANGE UP (ref 130–400)
POTASSIUM SERPL-MCNC: 3.9 MMOL/L — SIGNIFICANT CHANGE UP (ref 3.5–5)
POTASSIUM SERPL-MCNC: 4.2 MMOL/L — SIGNIFICANT CHANGE UP (ref 3.5–5)
POTASSIUM SERPL-SCNC: 3.9 MMOL/L — SIGNIFICANT CHANGE UP (ref 3.5–5)
POTASSIUM SERPL-SCNC: 4.2 MMOL/L — SIGNIFICANT CHANGE UP (ref 3.5–5)
PROT SERPL-MCNC: 5.9 G/DL — LOW (ref 6–8)
PROTHROM AB SERPL-ACNC: 13.3 SEC — HIGH (ref 9.95–12.87)
RBC # BLD: 3.94 M/UL — LOW (ref 4.2–5.4)
RBC # FLD: 13.6 % — SIGNIFICANT CHANGE UP (ref 11.5–14.5)
SODIUM SERPL-SCNC: 131 MMOL/L — LOW (ref 135–146)
SODIUM SERPL-SCNC: 135 MMOL/L — SIGNIFICANT CHANGE UP (ref 135–146)
TRIGL SERPL-MCNC: 64 MG/DL — SIGNIFICANT CHANGE UP
WBC # BLD: 5.3 K/UL — SIGNIFICANT CHANGE UP (ref 4.8–10.8)
WBC # FLD AUTO: 5.3 K/UL — SIGNIFICANT CHANGE UP (ref 4.8–10.8)

## 2022-07-15 PROCEDURE — 99232 SBSQ HOSP IP/OBS MODERATE 35: CPT

## 2022-07-15 PROCEDURE — 72148 MRI LUMBAR SPINE W/O DYE: CPT | Mod: 26

## 2022-07-15 PROCEDURE — 72141 MRI NECK SPINE W/O DYE: CPT | Mod: 26

## 2022-07-15 PROCEDURE — 72146 MRI CHEST SPINE W/O DYE: CPT | Mod: 26

## 2022-07-15 PROCEDURE — 73701 CT LOWER EXTREMITY W/DYE: CPT | Mod: 26,RT

## 2022-07-15 RX ORDER — LACTULOSE 10 G/15ML
20 SOLUTION ORAL EVERY 4 HOURS
Refills: 0 | Status: DISCONTINUED | OUTPATIENT
Start: 2022-07-15 | End: 2022-07-17

## 2022-07-15 RX ORDER — METHOCARBAMOL 500 MG/1
500 TABLET, FILM COATED ORAL EVERY 8 HOURS
Refills: 0 | Status: DISCONTINUED | OUTPATIENT
Start: 2022-07-15 | End: 2022-07-18

## 2022-07-15 RX ORDER — TAMSULOSIN HYDROCHLORIDE 0.4 MG/1
0.4 CAPSULE ORAL AT BEDTIME
Refills: 0 | Status: DISCONTINUED | OUTPATIENT
Start: 2022-07-15 | End: 2022-07-18

## 2022-07-15 RX ORDER — SODIUM CHLORIDE 9 MG/ML
1000 INJECTION INTRAMUSCULAR; INTRAVENOUS; SUBCUTANEOUS
Refills: 0 | Status: DISCONTINUED | OUTPATIENT
Start: 2022-07-15 | End: 2022-07-18

## 2022-07-15 RX ADMIN — GABAPENTIN 200 MILLIGRAM(S): 400 CAPSULE ORAL at 14:40

## 2022-07-15 RX ADMIN — OXYCODONE HYDROCHLORIDE 5 MILLIGRAM(S): 5 TABLET ORAL at 08:47

## 2022-07-15 RX ADMIN — Medication 650 MILLIGRAM(S): at 11:48

## 2022-07-15 RX ADMIN — Medication 650 MILLIGRAM(S): at 06:22

## 2022-07-15 RX ADMIN — ENOXAPARIN SODIUM 40 MILLIGRAM(S): 100 INJECTION SUBCUTANEOUS at 18:02

## 2022-07-15 RX ADMIN — Medication 650 MILLIGRAM(S): at 23:42

## 2022-07-15 RX ADMIN — SODIUM CHLORIDE 75 MILLILITER(S): 9 INJECTION INTRAMUSCULAR; INTRAVENOUS; SUBCUTANEOUS at 08:48

## 2022-07-15 RX ADMIN — Medication 200 MILLIGRAM(S): at 11:48

## 2022-07-15 RX ADMIN — METHOCARBAMOL 500 MILLIGRAM(S): 500 TABLET, FILM COATED ORAL at 08:47

## 2022-07-15 RX ADMIN — TAMSULOSIN HYDROCHLORIDE 0.4 MILLIGRAM(S): 0.4 CAPSULE ORAL at 08:48

## 2022-07-15 RX ADMIN — Medication 200 MILLIGRAM(S): at 05:27

## 2022-07-15 RX ADMIN — Medication 200 MILLIGRAM(S): at 23:42

## 2022-07-15 RX ADMIN — TAMSULOSIN HYDROCHLORIDE 0.4 MILLIGRAM(S): 0.4 CAPSULE ORAL at 22:01

## 2022-07-15 RX ADMIN — METHOCARBAMOL 500 MILLIGRAM(S): 500 TABLET, FILM COATED ORAL at 22:02

## 2022-07-15 RX ADMIN — SIMVASTATIN 10 MILLIGRAM(S): 20 TABLET, FILM COATED ORAL at 22:01

## 2022-07-15 RX ADMIN — Medication 200 MILLIGRAM(S): at 17:08

## 2022-07-15 RX ADMIN — METHOCARBAMOL 500 MILLIGRAM(S): 500 TABLET, FILM COATED ORAL at 14:40

## 2022-07-15 RX ADMIN — Medication 650 MILLIGRAM(S): at 17:44

## 2022-07-15 RX ADMIN — OXYCODONE HYDROCHLORIDE 5 MILLIGRAM(S): 5 TABLET ORAL at 11:46

## 2022-07-15 RX ADMIN — SENNA PLUS 2 TABLET(S): 8.6 TABLET ORAL at 22:01

## 2022-07-15 RX ADMIN — GABAPENTIN 200 MILLIGRAM(S): 400 CAPSULE ORAL at 22:01

## 2022-07-15 RX ADMIN — Medication 81 MILLIGRAM(S): at 11:48

## 2022-07-15 RX ADMIN — PANTOPRAZOLE SODIUM 40 MILLIGRAM(S): 20 TABLET, DELAYED RELEASE ORAL at 06:22

## 2022-07-15 RX ADMIN — Medication 25 MILLIGRAM(S): at 05:26

## 2022-07-15 RX ADMIN — LACTULOSE 20 GRAM(S): 10 SOLUTION ORAL at 17:08

## 2022-07-15 RX ADMIN — LACTULOSE 20 GRAM(S): 10 SOLUTION ORAL at 22:02

## 2022-07-15 RX ADMIN — Medication 200 MILLIGRAM(S): at 12:25

## 2022-07-15 RX ADMIN — LACTULOSE 20 GRAM(S): 10 SOLUTION ORAL at 14:40

## 2022-07-15 RX ADMIN — POLYETHYLENE GLYCOL 3350 17 GRAM(S): 17 POWDER, FOR SOLUTION ORAL at 11:48

## 2022-07-15 RX ADMIN — Medication 200 MILLIGRAM(S): at 17:44

## 2022-07-15 RX ADMIN — GABAPENTIN 200 MILLIGRAM(S): 400 CAPSULE ORAL at 05:26

## 2022-07-15 RX ADMIN — Medication 650 MILLIGRAM(S): at 17:09

## 2022-07-15 RX ADMIN — Medication 650 MILLIGRAM(S): at 12:25

## 2022-07-15 NOTE — PHYSICAL THERAPY INITIAL EVALUATION ADULT - ADDITIONAL COMMENTS
Pt lives with  in side apartment of daughters house, 1 LISA; 1 FOS to daughters home. Pt recently with limited ambulation using RW or SC due to back pain. Pt requires assistance with ADLs.

## 2022-07-15 NOTE — PROGRESS NOTE ADULT - SUBJECTIVE AND OBJECTIVE BOX
CHARLOTTE MCGINNIS    Patient is a 81y old  Female who presents with a chief complaint of   HPI:  80 YO F w/Pmhx of HTN, HLD and Chronic back pain(following with neurologist, Dr. Wolfe), presented for severe back pain. At baseline, patient uses a walker and cane for ambulation.    3 months ago patient started having lower back pain for which she saw neurologist who recommended physical therapy and gabapentin, patient did 4 weeks of physical therapy which did not help her back pain. 4 days ago lower back pain got worse, pain worse on right lower back, 8/10 intensity, dull/burning quality, radiating down the right leg. No associated urinary or fecal incontinence, lower leg weakness or numbness. Denies HA, dizziness, NVD, fever, SOB, chest pain, abdominal pain, change in urination and change in bowel habits.     In the ED, pain was relatively controlled with ketorolac.    Vital Signs Last 24 Hrs:  T(F): 98.3 (2022 11:18), Max: 98.3 (2022 11:18)  HR: 74 (2022 11:18) (74 - 74)  BP: 169/79 (2022 11:18) (169/79 - 169/79)  RR: 20 (2022 11:18) (20 - 20)  SpO2: 98% (2022 11:18) (98% - 98%)       (2022 15:23)    INTERVAL HPI/OVERNIGHT EVENTS:    ROS: All ROS negative except    PHYSICAL EXAM:  T(C): 36.7, Max: 36.7 (07-15-22 @ 05:04)  HR: 68 (67 - 68)  BP: 164/69 (148/69 - 172/76)  RR: 18 (18 - 19)  SpO2: 97% (97% - 98%)    GENERAL: NAD  NECK: Supple, No JVD  PULMONARY/CHEST: No rales, rhonchi, wheezing  CARDIOVASC: Regular rate and rhythm; No murmurs  GI/ABDOMEN: Soft, Nontender, Nondistended; Bowel sounds present  EXTREMITIES:  No clubbing, cyanosis, or edema, no deformity. No calf tenderness b/l.  SKIN: No rashes or lesions  NERVOUS SYSTEM:  Alert & Oriented X3, no gross neurological  deficit     Consultant(s) Notes Reviewed by me.   Care Discussed with Consultants/Other Providers     LABS:                        12.1   5.30  )-----------( 213      ( 15 Jul 2022 06:42 )             35.1     07-15    135  |  99  |  8<L>  ----------------------------<  87  4.2   |  27  |  <0.5<L>    Ca    8.7      15 Jul 2022 06:42    TPro  5.9<L>  /  Alb  3.7  /  TBili  0.8  /  DBili  x   /  AST  19  /  ALT  19  /  AlkPhos  80  07-15    PT/INR - ( 15 Jul 2022 06:42 )   PT: 13.30 sec;   INR: 1.16 ratio         PTT - ( 15 Jul 2022 06:42 )  PTT:30.9 sec  Urinalysis Basic - ( 2022 12:20 )    Color: Colorless / Appearance: Clear / S.007 / pH: x  Gluc: x / Ketone: Negative  / Bili: Negative / Urobili: <2 mg/dL   Blood: x / Protein: Trace / Nitrite: Negative   Leuk Esterase: Negative / RBC: x / WBC x   Sq Epi: x / Non Sq Epi: x / Bacteria: x      CAPILLARY BLOOD GLUCOSE                RADIOLOGY & ADDITIONAL TESTS:      MEDICATIONS  (STANDING):  acetaminophen     Tablet .. 650 milliGRAM(s) Oral every 6 hours  aspirin  chewable 81 milliGRAM(s) Oral daily  enoxaparin Injectable 40 milliGRAM(s) SubCutaneous every 24 hours  gabapentin 200 milliGRAM(s) Oral three times a day  ibuprofen  Tablet. 200 milliGRAM(s) Oral every 6 hours  methocarbamol 500 milliGRAM(s) Oral every 8 hours  metoprolol succinate ER 25 milliGRAM(s) Oral daily  pantoprazole    Tablet 40 milliGRAM(s) Oral before breakfast  polyethylene glycol 3350 17 Gram(s) Oral daily  senna 2 Tablet(s) Oral at bedtime  simvastatin 10 milliGRAM(s) Oral at bedtime  sodium chloride 0.9%. 1000 milliLiter(s) (75 mL/Hr) IV Continuous <Continuous>  tamsulosin 0.4 milliGRAM(s) Oral at bedtime    MEDICATIONS  (PRN):  melatonin 3 milliGRAM(s) Oral at bedtime PRN Insomnia  oxyCODONE    IR 5 milliGRAM(s) Oral every 6 hours PRN Moderate Pain (4 - 6)

## 2022-07-15 NOTE — MEDICAL STUDENT PROGRESS NOTE(EDUCATION) - SUBJECTIVE AND OBJECTIVE BOX
CHARLOTTE MCGINNIS 81y Female  MRN#: [id number]   CODE STATUS: full    Hospital Day: 1d    Pt is currently admitted with the primary diagnosis of right hip/back pain    SUBJECTIVE  Hospital Course  80 YO F w/Pmhx of HTN, HLD and Chronic back pain(following with neurologist, Dr. Wolfe), presented for severe back pain. At baseline, patient uses a walker and cane for ambulation. Currently ambulation limited secondary to pain    3 months ago patient started having lower back pain for which she saw neurologist who recommended physical therapy and gabapentin, patient did 4 weeks of physical therapy which did not help her back pain. 4 days ago lower back pain got worse, pain worse on right lower back, 8/10 intensity, dull/burning quality, radiating down the right leg. No associated urinary or fecal incontinence, lower leg weakness or numbness. Denies HA, dizziness, NVD, fever, SOB, chest pain, abdominal pain, change in urination and change in bowel habits.     In the ED, pain was relatively controlled with ketorolac.    Overnight events: none    Subjective complaints: patient A&Ox3. Complaining of right hip pain    Present Today:   - Vences:  No [  ], Yes [x   ] : Indication: urinary retention and limited mobility secondary to hip pain  - Type of IV Access:       .. CVC/Piccline:  No [x  ], Yes [   ] : Indication:       .. Midline: No [ x ], Yes [   ] : Indication:                                             ----------------------------------------------------------  OBJECTIVE  PAST MEDICAL & SURGICAL HISTORY  HTN (hypertension)    Dyslipidemia                                              -----------------------------------------------------------  ALLERGIES:  penicillins (Other (U))                                            ------------------------------------------------------------    HOME MEDICATIONS  Home Medications:  aspirin 81 mg oral tablet: 1 tab(s) orally once a day (2022 16:20)  gabapentin 100 mg oral capsule: 1 cap(s) orally 3 times a day (2022 16:20)  metoprolol succinate 25 mg oral tablet, extended release: 1 tab(s) orally once a day (2022 16:20)  simvastatin 10 mg oral tablet: 1 tab(s) orally once a day (at bedtime) (2022 16:20)                           MEDICATIONS:  STANDING MEDICATIONS  acetaminophen     Tablet .. 650 milliGRAM(s) Oral every 6 hours  aspirin  chewable 81 milliGRAM(s) Oral daily  enoxaparin Injectable 40 milliGRAM(s) SubCutaneous every 24 hours  gabapentin 200 milliGRAM(s) Oral three times a day  ibuprofen  Tablet. 200 milliGRAM(s) Oral every 6 hours  methocarbamol 500 milliGRAM(s) Oral every 8 hours  metoprolol succinate ER 25 milliGRAM(s) Oral daily  pantoprazole    Tablet 40 milliGRAM(s) Oral before breakfast  polyethylene glycol 3350 17 Gram(s) Oral daily  senna 2 Tablet(s) Oral at bedtime  simvastatin 10 milliGRAM(s) Oral at bedtime  sodium chloride 0.9%. 1000 milliLiter(s) IV Continuous <Continuous>  tamsulosin 0.4 milliGRAM(s) Oral at bedtime    PRN MEDICATIONS  melatonin 3 milliGRAM(s) Oral at bedtime PRN  oxyCODONE    IR 5 milliGRAM(s) Oral every 6 hours PRN                                            ------------------------------------------------------------  VITAL SIGNS: Last 24 Hours  T(C): 36.7 (15 Jul 2022 05:04), Max: 36.8 (2022 11:18)  T(F): 98.1 (15 Jul 2022 05:04), Max: 98.3 (2022 11:18)  HR: 68 (15 Jul 2022 05:04) (67 - 74)  BP: 164/69 (15 Jul 2022 05:04) (148/69 - 172/76)  BP(mean): 68 (15 Jul 2022 05:04) (68 - 99)  RR: 18 (2022 23:01) (18 - 20)  SpO2: 97% (2022 23:01) (97% - 98%)                                             --------------------------------------------------------------  LABS:                        12.1   5.30  )-----------( 213      ( 15 Jul 2022 06:42 )             35.1     07-15    135  |  99  |  8<L>  ----------------------------<  87  4.2   |  27  |  <0.5<L>    Ca    8.7      15 Jul 2022 06:42    TPro  5.9<L>  /  Alb  3.7  /  TBili  0.8  /  DBili  x   /  AST  19  /  ALT  19  /  AlkPhos  80  07-15    PT/INR - ( 15 Jul 2022 06:42 )   PT: 13.30 sec;   INR: 1.16 ratio         PTT - ( 15 Jul 2022 06:42 )  PTT:30.9 sec  Urinalysis Basic - ( 2022 12:20 )    Color: Colorless / Appearance: Clear / S.007 / pH: x  Gluc: x / Ketone: Negative  / Bili: Negative / Urobili: <2 mg/dL   Blood: x / Protein: Trace / Nitrite: Negative   Leuk Esterase: Negative / RBC: x / WBC x   Sq Epi: x / Non Sq Epi: x / Bacteria: x                                              -------------------------------------------------------------  RADIOLOGY:  < from: US Kidney and Bladder (22 @ 17:58) >  IMPRESSION:    Normal evaluation of the bilateral kidneys.    Vences catheter appears within the urinary bladder.    < from: CT Abdomen and Pelvis w/ IV Cont (22 @ 14:01) >  IMPRESSION:    Markedly distended urinary bladder with mild bilateral hydroureter.    Mild to moderate compression deformity L3 vertebral body, indeterminate   chronicity.                                     --------------------------------------------------------------    PHYSICAL EXAM:  GENERAL: NAD, well-developed  HEAD:  Atraumatic, Normocephalic  EYES: EOMI, PERRLA, conjunctiva and sclera clear  ENT:No nasal obstruction or discharge. No tonsillar exudate, swelling or erythema.  NECK: Supple, No JVD  CHEST/LUNG: Clear to auscultation bilaterally; No wheeze  HEART: Regular rate and rhythm; No murmurs, rubs, or gallops  ABDOMEN: Soft, Nontender, Nondistended; Bowel sounds present  EXTREMITIES:  2+ Peripheral Pulses, No clubbing, cyanosis, or edema. 8/10 pain in R hip and R LE. No erythema   PSYCH: AAOx3  NEUROLOGY: non-focal  SKIN: No rashes or lesions                                         --------------------------------------------------------------    ASSESSMENT & PLAN    80 YO F w/ Pmhx of HTN, HLD and Chronic back pain (following with neurologist, Dr. Wolfe), presented for severe back pain. At baseline, patient uses a walker and cane for ambulation.    #Worsening chronic lower back pain 2/2 paraspinal muscle spasm?  #Chronic L3 compression fracture  - CT A&P shows possibly chronic L3 vertebral fracture and urinary bladder distention  - No incontinence, weakness or numbness  - Pain control: Tylenol 650mg q6, Motrin 200mg q6hrs, Gabapentin 200mg TID, Oxycodone 5mg q6 PRN  - PT/OT eval  - Per Neurology: Cervical and thoracic MRI non-con, CT R Hip with contrast  - Neurology appreciated hyperreflexia on exam    #Concern Urinary retention  - Mild hypogastrium tenderness, concern for retention on CT A&P  - Will straight cath, if retaining will place a vences  - RBUS: Normal eval of b/l kidneys     #Hyponatremia  - Na 127 on admission, no headache, no altered consciousness on admission. No baseline.  - Send Serum Osm, Urine lytes  - Monitor, if trending down, consult nephrology  - Na 135 today (7/15) - continue to monitor    #HTN  - c/w Metoprolol    #HLD  - c/w Statin    #Misc  -DVT prophylaxis: Lovenox  -GI prophylaxis: Protonix  -Diet: DASH  -Code status: Full code  -Activity: IAT  -Dispo: Pending PT                                                                                                          ----------------------------------------------------  # DVT prophylaxis     # GI prophylaxis     # Diet     # Activity Score (AM-PAC)    # Code status     # Disposition                                                                              --------------------------------------------------------    # Handoff      CHARLOTTE MCGINNIS 81y Female  MRN#: [id number]   CODE STATUS: full    Hospital Day: 1d    Pt is currently admitted with the primary diagnosis of right hip/back pain    SUBJECTIVE  Hospital Course  80 YO F w/Pmhx of HTN, HLD and Chronic back pain(following with neurologist, Dr. Wolfe), presented for severe back pain. At baseline, patient uses a walker and cane for ambulation. Currently ambulation limited secondary to pain    3 months ago patient started having lower back pain for which she saw neurologist who recommended physical therapy and gabapentin, patient did 4 weeks of physical therapy which did not help her back pain. 4 days ago lower back pain got worse, pain worse on right lower back, 8/10 intensity, dull/burning quality, radiating down the right leg. No associated urinary or fecal incontinence, lower leg weakness or numbness. Denies HA, dizziness, NVD, fever, SOB, chest pain, abdominal pain, change in urination and change in bowel habits.     In the ED, pain was relatively controlled with ketorolac.    Overnight events: none    Subjective complaints: patient A&Ox3. Complaining of right hip pain    Present Today:   - Vences:  No [  ], Yes [x   ] : Indication: urinary retention and limited mobility secondary to hip pain  - Type of IV Access:       .. CVC/Piccline:  No [x  ], Yes [   ] : Indication:       .. Midline: No [ x ], Yes [   ] : Indication:                                             ----------------------------------------------------------  OBJECTIVE  PAST MEDICAL & SURGICAL HISTORY  HTN (hypertension)    Dyslipidemia                                              -----------------------------------------------------------  ALLERGIES:  penicillins (Other (U))                                            ------------------------------------------------------------    HOME MEDICATIONS  Home Medications:  aspirin 81 mg oral tablet: 1 tab(s) orally once a day (2022 16:20)  gabapentin 100 mg oral capsule: 1 cap(s) orally 3 times a day (2022 16:20)  metoprolol succinate 25 mg oral tablet, extended release: 1 tab(s) orally once a day (2022 16:20)  simvastatin 10 mg oral tablet: 1 tab(s) orally once a day (at bedtime) (2022 16:20)                           MEDICATIONS:  STANDING MEDICATIONS  acetaminophen     Tablet .. 650 milliGRAM(s) Oral every 6 hours  aspirin  chewable 81 milliGRAM(s) Oral daily  enoxaparin Injectable 40 milliGRAM(s) SubCutaneous every 24 hours  gabapentin 200 milliGRAM(s) Oral three times a day  ibuprofen  Tablet. 200 milliGRAM(s) Oral every 6 hours  methocarbamol 500 milliGRAM(s) Oral every 8 hours  metoprolol succinate ER 25 milliGRAM(s) Oral daily  pantoprazole    Tablet 40 milliGRAM(s) Oral before breakfast  polyethylene glycol 3350 17 Gram(s) Oral daily  senna 2 Tablet(s) Oral at bedtime  simvastatin 10 milliGRAM(s) Oral at bedtime  sodium chloride 0.9%. 1000 milliLiter(s) IV Continuous <Continuous>  tamsulosin 0.4 milliGRAM(s) Oral at bedtime    PRN MEDICATIONS  melatonin 3 milliGRAM(s) Oral at bedtime PRN  oxyCODONE    IR 5 milliGRAM(s) Oral every 6 hours PRN                                            ------------------------------------------------------------  VITAL SIGNS: Last 24 Hours  T(C): 36.7 (15 Jul 2022 05:04), Max: 36.8 (2022 11:18)  T(F): 98.1 (15 Jul 2022 05:04), Max: 98.3 (2022 11:18)  HR: 68 (15 Jul 2022 05:04) (67 - 74)  BP: 164/69 (15 Jul 2022 05:04) (148/69 - 172/76)  BP(mean): 68 (15 Jul 2022 05:04) (68 - 99)  RR: 18 (2022 23:01) (18 - 20)  SpO2: 97% (2022 23:01) (97% - 98%)                                             --------------------------------------------------------------  LABS:                        12.1   5.30  )-----------( 213      ( 15 Jul 2022 06:42 )             35.1     07-15    135  |  99  |  8<L>  ----------------------------<  87  4.2   |  27  |  <0.5<L>    Ca    8.7      15 Jul 2022 06:42    TPro  5.9<L>  /  Alb  3.7  /  TBili  0.8  /  DBili  x   /  AST  19  /  ALT  19  /  AlkPhos  80  07-15    PT/INR - ( 15 Jul 2022 06:42 )   PT: 13.30 sec;   INR: 1.16 ratio         PTT - ( 15 Jul 2022 06:42 )  PTT:30.9 sec  Urinalysis Basic - ( 2022 12:20 )    Color: Colorless / Appearance: Clear / S.007 / pH: x  Gluc: x / Ketone: Negative  / Bili: Negative / Urobili: <2 mg/dL   Blood: x / Protein: Trace / Nitrite: Negative   Leuk Esterase: Negative / RBC: x / WBC x   Sq Epi: x / Non Sq Epi: x / Bacteria: x                                              -------------------------------------------------------------  RADIOLOGY:  < from: US Kidney and Bladder (22 @ 17:58) >  IMPRESSION:    Normal evaluation of the bilateral kidneys.    Vences catheter appears within the urinary bladder.    < from: CT Abdomen and Pelvis w/ IV Cont (22 @ 14:01) >  IMPRESSION:    Markedly distended urinary bladder with mild bilateral hydroureter.    Mild to moderate compression deformity L3 vertebral body, indeterminate   chronicity.                                     --------------------------------------------------------------    PHYSICAL EXAM:  GENERAL: NAD, well-developed  HEAD:  Atraumatic, Normocephalic  EYES: EOMI, PERRLA, conjunctiva and sclera clear  ENT:No nasal obstruction or discharge. No tonsillar exudate, swelling or erythema.  NECK: Supple, No JVD  CHEST/LUNG: Clear to auscultation bilaterally; No wheeze  HEART: Regular rate and rhythm; No murmurs, rubs, or gallops  ABDOMEN: Soft, Nontender, Nondistended; Bowel sounds present  EXTREMITIES:  2+ Peripheral Pulses, No clubbing, cyanosis, or edema. 8/10 pain in R hip and R LE. No erythema   PSYCH: AAOx3  NEUROLOGY: non-focal  SKIN: No rashes or lesions                                         --------------------------------------------------------------    ASSESSMENT & PLAN    80 YO F w/ Pmhx of HTN, HLD and Chronic back pain (following with neurologist, Dr. Wolfe), presented for severe back pain. At baseline, patient uses a walker and cane for ambulation.    #Worsening chronic lower back pain 2/2 paraspinal muscle spasm?  #Chronic L3 compression fracture  - CT A&P shows possibly chronic L3 vertebral fracture and urinary bladder distention  - No incontinence, weakness or numbness  - Pain control: Tylenol 650mg q6, Motrin 200mg q6hrs, Gabapentin 200mg TID, Oxycodone 5mg q6 PRN  - PT/OT eval  - Per Neurology: Cervical and thoracic MRI non-con, CT R Hip with contrast pending  - Neurology appreciated hyperreflexia on exam  - Lumbar MRI pending  - Added Robaxin 500mg     #Concern Urinary retention  - Mild hypogastrium tenderness, concern for retention on CT A&P  - Will straight cath, if retaining will place a vences  - RBUS: Normal eval of b/l kidneys   - Added flomax 0.4 for urinary retension    #Hyponatremia  - Na 127 on admission, no headache, no altered consciousness on admission. No baseline.  - Serum Osm 275  - f/u Urine lytes  - Monitor, if trending down, consult nephrology  - Na 135 today (7/15) - continue to monitor  - Repeat BMP at 4pm  - gentle hydration (NS 75cc/hr)     #HTN  - c/w Metoprolol    #HLD  - c/w Statin    #Misc  -DVT prophylaxis: Lovenox  -GI prophylaxis: Protonix  -Diet: DASH  -Code status: Full code  -Activity: IAT  -Dispo: Pending PT                                                                 ----------------------------------------------------  # DVT prophylaxis: lovenox  # GI prophylaxis: PPI  # Diet: Dash/TLC  # Code status: full  # Disposition: acute  Pending: MRI Cervical, Thoracic Lumber w/o contrast, CT R hip w/ con, f/u urine electrolytes, PT consult                                                                           --------------------------------------------------------    # Handoff

## 2022-07-15 NOTE — PHYSICAL THERAPY INITIAL EVALUATION ADULT - PERTINENT HX OF CURRENT PROBLEM, REHAB EVAL
82 YO F w/Pmhx of HTN, HLD and Chronic back pain(following with neurologist, Dr. Wolfe), presented for severe back pain. At baseline, patient uses a walker and cane for ambulation.

## 2022-07-16 LAB
ANION GAP SERPL CALC-SCNC: 10 MMOL/L — SIGNIFICANT CHANGE UP (ref 7–14)
BASOPHILS # BLD AUTO: 0.02 K/UL — SIGNIFICANT CHANGE UP (ref 0–0.2)
BASOPHILS NFR BLD AUTO: 0.3 % — SIGNIFICANT CHANGE UP (ref 0–1)
BUN SERPL-MCNC: 7 MG/DL — LOW (ref 10–20)
CALCIUM SERPL-MCNC: 8.4 MG/DL — LOW (ref 8.5–10.1)
CHLORIDE SERPL-SCNC: 96 MMOL/L — LOW (ref 98–110)
CO2 SERPL-SCNC: 26 MMOL/L — SIGNIFICANT CHANGE UP (ref 17–32)
CREAT SERPL-MCNC: <0.5 MG/DL — LOW (ref 0.7–1.5)
CULTURE RESULTS: SIGNIFICANT CHANGE UP
EGFR: 100 ML/MIN/1.73M2 — SIGNIFICANT CHANGE UP
EOSINOPHIL # BLD AUTO: 0.15 K/UL — SIGNIFICANT CHANGE UP (ref 0–0.7)
EOSINOPHIL NFR BLD AUTO: 2.5 % — SIGNIFICANT CHANGE UP (ref 0–8)
GLUCOSE SERPL-MCNC: 84 MG/DL — SIGNIFICANT CHANGE UP (ref 70–99)
HCT VFR BLD CALC: 33 % — LOW (ref 37–47)
HGB BLD-MCNC: 11.2 G/DL — LOW (ref 12–16)
IMM GRANULOCYTES NFR BLD AUTO: 0.3 % — SIGNIFICANT CHANGE UP (ref 0.1–0.3)
LYMPHOCYTES # BLD AUTO: 1.05 K/UL — LOW (ref 1.2–3.4)
LYMPHOCYTES # BLD AUTO: 17.5 % — LOW (ref 20.5–51.1)
MAGNESIUM SERPL-MCNC: 1.9 MG/DL — SIGNIFICANT CHANGE UP (ref 1.8–2.4)
MCHC RBC-ENTMCNC: 30 PG — SIGNIFICANT CHANGE UP (ref 27–31)
MCHC RBC-ENTMCNC: 33.9 G/DL — SIGNIFICANT CHANGE UP (ref 32–37)
MCV RBC AUTO: 88.5 FL — SIGNIFICANT CHANGE UP (ref 81–99)
MONOCYTES # BLD AUTO: 0.54 K/UL — SIGNIFICANT CHANGE UP (ref 0.1–0.6)
MONOCYTES NFR BLD AUTO: 9 % — SIGNIFICANT CHANGE UP (ref 1.7–9.3)
NEUTROPHILS # BLD AUTO: 4.21 K/UL — SIGNIFICANT CHANGE UP (ref 1.4–6.5)
NEUTROPHILS NFR BLD AUTO: 70.4 % — SIGNIFICANT CHANGE UP (ref 42.2–75.2)
NRBC # BLD: 0 /100 WBCS — SIGNIFICANT CHANGE UP (ref 0–0)
PLATELET # BLD AUTO: 211 K/UL — SIGNIFICANT CHANGE UP (ref 130–400)
POTASSIUM SERPL-MCNC: 4 MMOL/L — SIGNIFICANT CHANGE UP (ref 3.5–5)
POTASSIUM SERPL-SCNC: 4 MMOL/L — SIGNIFICANT CHANGE UP (ref 3.5–5)
RBC # BLD: 3.73 M/UL — LOW (ref 4.2–5.4)
RBC # FLD: 13.6 % — SIGNIFICANT CHANGE UP (ref 11.5–14.5)
SODIUM SERPL-SCNC: 132 MMOL/L — LOW (ref 135–146)
SPECIMEN SOURCE: SIGNIFICANT CHANGE UP
WBC # BLD: 5.99 K/UL — SIGNIFICANT CHANGE UP (ref 4.8–10.8)
WBC # FLD AUTO: 5.99 K/UL — SIGNIFICANT CHANGE UP (ref 4.8–10.8)

## 2022-07-16 PROCEDURE — 99233 SBSQ HOSP IP/OBS HIGH 50: CPT

## 2022-07-16 RX ADMIN — SENNA PLUS 2 TABLET(S): 8.6 TABLET ORAL at 21:52

## 2022-07-16 RX ADMIN — METHOCARBAMOL 500 MILLIGRAM(S): 500 TABLET, FILM COATED ORAL at 15:27

## 2022-07-16 RX ADMIN — ENOXAPARIN SODIUM 40 MILLIGRAM(S): 100 INJECTION SUBCUTANEOUS at 20:35

## 2022-07-16 RX ADMIN — GABAPENTIN 200 MILLIGRAM(S): 400 CAPSULE ORAL at 05:42

## 2022-07-16 RX ADMIN — Medication 650 MILLIGRAM(S): at 12:00

## 2022-07-16 RX ADMIN — Medication 650 MILLIGRAM(S): at 17:42

## 2022-07-16 RX ADMIN — SIMVASTATIN 10 MILLIGRAM(S): 20 TABLET, FILM COATED ORAL at 21:52

## 2022-07-16 RX ADMIN — Medication 650 MILLIGRAM(S): at 11:43

## 2022-07-16 RX ADMIN — Medication 650 MILLIGRAM(S): at 06:32

## 2022-07-16 RX ADMIN — Medication 81 MILLIGRAM(S): at 11:42

## 2022-07-16 RX ADMIN — LACTULOSE 20 GRAM(S): 10 SOLUTION ORAL at 21:52

## 2022-07-16 RX ADMIN — Medication 650 MILLIGRAM(S): at 23:41

## 2022-07-16 RX ADMIN — PANTOPRAZOLE SODIUM 40 MILLIGRAM(S): 20 TABLET, DELAYED RELEASE ORAL at 06:33

## 2022-07-16 RX ADMIN — METHOCARBAMOL 500 MILLIGRAM(S): 500 TABLET, FILM COATED ORAL at 21:52

## 2022-07-16 RX ADMIN — Medication 200 MILLIGRAM(S): at 23:41

## 2022-07-16 RX ADMIN — TAMSULOSIN HYDROCHLORIDE 0.4 MILLIGRAM(S): 0.4 CAPSULE ORAL at 21:52

## 2022-07-16 RX ADMIN — Medication 650 MILLIGRAM(S): at 01:00

## 2022-07-16 RX ADMIN — POLYETHYLENE GLYCOL 3350 17 GRAM(S): 17 POWDER, FOR SOLUTION ORAL at 11:44

## 2022-07-16 RX ADMIN — Medication 200 MILLIGRAM(S): at 17:41

## 2022-07-16 RX ADMIN — Medication 200 MILLIGRAM(S): at 23:43

## 2022-07-16 RX ADMIN — Medication 200 MILLIGRAM(S): at 01:00

## 2022-07-16 RX ADMIN — Medication 200 MILLIGRAM(S): at 06:32

## 2022-07-16 RX ADMIN — Medication 200 MILLIGRAM(S): at 12:00

## 2022-07-16 RX ADMIN — METHOCARBAMOL 500 MILLIGRAM(S): 500 TABLET, FILM COATED ORAL at 05:42

## 2022-07-16 RX ADMIN — GABAPENTIN 200 MILLIGRAM(S): 400 CAPSULE ORAL at 21:52

## 2022-07-16 RX ADMIN — Medication 200 MILLIGRAM(S): at 11:43

## 2022-07-16 RX ADMIN — OXYCODONE HYDROCHLORIDE 5 MILLIGRAM(S): 5 TABLET ORAL at 10:01

## 2022-07-16 RX ADMIN — Medication 650 MILLIGRAM(S): at 23:43

## 2022-07-16 RX ADMIN — Medication 25 MILLIGRAM(S): at 05:42

## 2022-07-16 RX ADMIN — Medication 650 MILLIGRAM(S): at 05:41

## 2022-07-16 RX ADMIN — Medication 200 MILLIGRAM(S): at 05:41

## 2022-07-16 RX ADMIN — GABAPENTIN 200 MILLIGRAM(S): 400 CAPSULE ORAL at 15:27

## 2022-07-16 NOTE — PROGRESS NOTE ADULT - ASSESSMENT
82 yo F PMHx of HTN, HLD, and chronic back pain (following with neurologist, Dr. Wolfe), presented for severe back pain. At baseline, patient uses a walker and cane for ambulation.  3 months ago patient started having lower back pain for which she saw neurologist who recommended physical therapy and gabapentin, patient did 4 weeks of physical therapy which did not help her back pain. 4 days prior to admission her lower back pain got worse, pain worse on right lower back, 8/10 intensity, dull/burning quality, radiating down the right leg.      Worsening chronic lower back pain  Chronic L3 compression fracture  -CT A&P shows possibly chronic L3 vertebral fracture and urinary bladder distention  -No incontinence, weakness or numbness  -Pain control  -PT/OT eval  -Neurology consult appreciated  - pending MRI CS TS and LS read. (performed but no report yet).  - pt reports having pain mgmt consult for 7/18. If MRI negative pt can likely be discharged home and follow up with pain mgmt. Pt declines to go today, anticipated for tomorrow    Hyponatremia  - improving  - continue to monitor    Constipation  - bowel regimen  - lactulose if needed    Concern Urinary retention  - keep vences  - tov after MRIs    HTN  - c/w Metoprolol    HLD  - c/w Statin    #Misc  -DVT prophylaxis: Lovenox  -GI prophylaxis: Protonix  -Diet: DASH  -Code status: Full code  -Activity: IAT    Progress Note Handoff:  Pending (specify):  MRI read,  Family discussion: discussed pending MRI read  Disposition: Home__x_/SNF___/Other________/Unknown at this time________  -Dispo: Pending PT, MRI spine, TOV
#Worsening chronic lower back pain 2/2 paraspinal muscle spasm?  #Chronic L3 compression fracture  -CT A&P shows possibly chronic L3 vertebral fracture and urinary bladder distention  -No incontinence, weakness or numbness  -Pain control  -PT/OT eval  -Neurology consult appreciated  - pending MRI CS TS and LS    #constipation:  - bowel regimen  - lactulose if needed    #Concern Urinary retention  - keep vences  -  tov after MRIs    #HTN  - c/w Metoprolol    #HLD  - c/w Statin    #Misc  -DVT prophylaxis: Lovenox  -GI prophylaxis: Protonix  -Diet: DASH  -Code status: Full code  -Activity: IAT  -Dispo: Pending PT, MRI spine, TOV

## 2022-07-16 NOTE — PROGRESS NOTE ADULT - SUBJECTIVE AND OBJECTIVE BOX
CHARLOTTE MCGINNIS 81y Female  MRN#: [id number]   CODE STATUS: full    Hospital Day: 2d    Pt is currently admitted with the primary diagnosis of right hip/back pain    SUBJECTIVE  Hospital Course  80 YO F w/Pmhx of HTN, HLD and Chronic back pain(following with neurologist, Dr. Wolfe), presented for severe back pain. At baseline, patient uses a walker and cane for ambulation. Currently ambulation limited secondary to pain    3 months ago patient started having lower back pain for which she saw neurologist who recommended physical therapy and gabapentin, patient did 4 weeks of physical therapy which did not help her back pain. 4 days ago lower back pain got worse, pain worse on right lower back, 8/10 intensity, dull/burning quality, radiating down the right leg. No associated urinary or fecal incontinence, lower leg weakness or numbness. Denies HA, dizziness, NVD, fever, SOB, chest pain, abdominal pain, change in urination and change in bowel habits.     In the ED, pain was relatively controlled with ketorolac.    Overnight events: none    Subjective complaints: patient A&Ox3. Complaining of right hip pain and pain in right foot    Present Today:   - Vences:  No [  ], Yes [x   ] : Indication: urinary retention and limited mobility secondary to hip pain  - Type of IV Access:       .. CVC/Piccline:  No [x  ], Yes [   ] : Indication:       .. Midline: No [ x ], Yes [   ] : Indication:                                             ----------------------------------------------------------  OBJECTIVE  PAST MEDICAL & SURGICAL HISTORY  HTN (hypertension)    Dyslipidemia                                              -----------------------------------------------------------  ALLERGIES:  penicillins (Other (U))                                            ------------------------------------------------------------    HOME MEDICATIONS  Home Medications:  aspirin 81 mg oral tablet: 1 tab(s) orally once a day (2022 16:20)  gabapentin 100 mg oral capsule: 1 cap(s) orally 3 times a day (2022 16:20)  metoprolol succinate 25 mg oral tablet, extended release: 1 tab(s) orally once a day (2022 16:20)  simvastatin 10 mg oral tablet: 1 tab(s) orally once a day (at bedtime) (2022 16:20)                           MEDICATIONS:  STANDING MEDICATIONS  acetaminophen     Tablet .. 650 milliGRAM(s) Oral every 6 hours  aspirin  chewable 81 milliGRAM(s) Oral daily  enoxaparin Injectable 40 milliGRAM(s) SubCutaneous every 24 hours  gabapentin 200 milliGRAM(s) Oral three times a day  ibuprofen  Tablet. 200 milliGRAM(s) Oral every 6 hours  methocarbamol 500 milliGRAM(s) Oral every 8 hours  metoprolol succinate ER 25 milliGRAM(s) Oral daily  pantoprazole    Tablet 40 milliGRAM(s) Oral before breakfast  polyethylene glycol 3350 17 Gram(s) Oral daily  senna 2 Tablet(s) Oral at bedtime  simvastatin 10 milliGRAM(s) Oral at bedtime  sodium chloride 0.9%. 1000 milliLiter(s) IV Continuous <Continuous>  tamsulosin 0.4 milliGRAM(s) Oral at bedtime    PRN MEDICATIONS  melatonin 3 milliGRAM(s) Oral at bedtime PRN  oxyCODONE    IR 5 milliGRAM(s) Oral every 6 hours PRN                                            ------------------------------------------------------------  VITAL SIGNS: Last 24 Hours  Vital Signs Last 24 Hrs  T(C): 37.1 (2022 05:15), Max: 37.1 (2022 05:15)  T(F): 98.7 (2022 05:15), Max: 98.7 (2022 05:15)  HR: 83 (2022 05:15) (66 - 83)  BP: 152/65 (2022 05:15) (121/58 - 152/65)  BP(mean): 94 (2022 05:15) (94 - 94)  RR: 18 (2022 05:15) (18 - 18)                                           --------------------------------------------------------------  LABS:    07-15    131<L>  |  95<L>  |  16  ----------------------------<  124<H>  3.9   |  25  |  0.5<L>    Ca    8.8      15 Jul 2022 18:05    TPro  5.9<L>  /  Alb  3.7  /  TBili  0.8  /  DBili  x   /  AST  19  /  ALT  19  /  AlkPhos  80  07-15                          12.1   5.30  )-----------( 213      ( 15 Jul 2022 06:42 )             35.1       Urinalysis Basic - ( 2022 12:20 )  Color: Colorless / Appearance: Clear / S.007 / pH: x  Gluc: x / Ketone: Negative  / Bili: Negative / Urobili: <2 mg/dL   Blood: x / Protein: Trace / Nitrite: Negative   Leuk Esterase: Negative / RBC: x / WBC x   Sq Epi: x / Non Sq Epi: x / Bacteria: x                                              -------------------------------------------------------------  RADIOLOGY:  < from: US Kidney and Bladder (22 @ 17:58) >  IMPRESSION:    Normal evaluation of the bilateral kidneys.    Vences catheter appears within the urinary bladder.    < from: CT Abdomen and Pelvis w/ IV Cont (22 @ 14:01) >  IMPRESSION:    Markedly distended urinary bladder with mild bilateral hydroureter.    Mild to moderate compression deformity L3 vertebral body, indeterminate   chronicity.                                     --------------------------------------------------------------    PHYSICAL EXAM:  GENERAL: NAD, well-developed  HEAD:  Atraumatic, Normocephalic  EYES: EOMI, PERRLA, conjunctiva and sclera clear  ENT:No nasal obstruction or discharge. No tonsillar exudate, swelling or erythema.  NECK: Supple, No JVD  CHEST/LUNG: Clear to auscultation bilaterally; No wheeze  HEART: Regular rate and rhythm; No murmurs, rubs, or gallops  ABDOMEN: Soft, Nontender, Nondistended; Bowel sounds present  EXTREMITIES:  2+ Peripheral Pulses, No clubbing, cyanosis, or edema. 8/10 pain in R hip and R LE. No erythema   PSYCH: AAOx3  NEUROLOGY: non-focal  SKIN: No rashes or lesions, no discoloration or erythema on either foot                                         --------------------------------------------------------------    ASSESSMENT & PLAN    80 YO F w/ Pmhx of HTN, HLD and Chronic back pain (following with neurologist, Dr. Wolfe), presented for severe back pain. At baseline, patient uses a walker and cane for ambulation.    #Worsening chronic lower back pain 2/2 paraspinal muscle spasm?  #Chronic L3 compression fracture  - CT A&P shows possibly chronic L3 vertebral fracture and urinary bladder distention  - No incontinence, weakness or numbness  - Pain control: Tylenol 650mg q6, Motrin 200mg q6hrs, Gabapentin 200mg TID, Oxycodone 5mg q6 PRN  - PT/OT eval  - Per Neurology: Cervical and thoracic MRI non-con, CT R Hip with contrast pending  - Neurology appreciated hyperreflexia on exam  - Lumbar MRI pending  - Added Robaxin 500mg     #Concern Urinary retention  - Mild hypogastrium tenderness, concern for retention on CT A&P  - Will straight cath, if retaining will place a vences  - RBUS: Normal eval of b/l kidneys   - Added flomax 0.4 for urinary retension    #Hyponatremia  - Na 127 on admission, no headache, no altered consciousness on admission. No baseline.  - Serum Osm 275  - f/u Urine lytes  - Monitor, if trending down, consult nephrology  - Na 135 today (7/15) - continue to monitor  - Repeat BMP at 4pm  - gentle hydration (NS 75cc/hr)     #HTN  - c/w Metoprolol    #HLD  - c/w Statin    #Dispo: Home with home PT    #Misc  -DVT prophylaxis: Lovenox  -GI prophylaxis: Protonix  -Diet: DASH  -Code status: Full code  -Activity: IAT                                                                 ----------------------------------------------------  # DVT prophylaxis: lovenox  # GI prophylaxis: PPI  # Diet: Dash/TLC  # Code status: full  # Disposition: acute  Pending: MRI Cervical, Thoracic Lumber w/o contrast, CT R hip w/ con, f/u urine electrolytes CHARLOTTE MCGINNIS 81y Female  MRN#: [id number]   CODE STATUS: Full    Hospital Day: 2d    Pt is currently admitted with the primary diagnosis of right hip/back pain    SUBJECTIVE  Hospital Course  82 YO F w/Pmhx of HTN, HLD and Chronic back pain(following with neurologist, Dr. Wolfe), presented for severe back pain. At baseline, patient uses a walker and cane for ambulation. Currently ambulation limited secondary to pain. 3 months ago patient started having lower back pain for which she saw neurologist who recommended physical therapy and gabapentin, patient did 4 weeks of physical therapy which did not help her back pain. 4 days ago lower back pain got worse, pain worse on right lower back, 8/10 intensity, dull/burning quality, radiating down the right leg. No associated urinary or fecal incontinence, lower leg weakness or numbness. Denies HA, dizziness, NVD, fever, SOB, chest pain, abdominal pain, change in urination and change in bowel habits. In the ED, pain was relatively controlled with ketorolac.    On evaluation this morning, patient appeared well and was engaged in interview. Patient reported continued chronic back pain and R foot pain, but no other complaints. No acute events overnight.    Present Today:   - Vences:  No [  ], Yes [x   ] : Indication: urinary retention and limited mobility secondary to hip pain  - Type of IV Access:       .. CVC/Piccline:  No [x  ], Yes [   ] : Indication:       .. Midline: No [ x ], Yes [   ] : Indication:                                             ----------------------------------------------------------  OBJECTIVE  PAST MEDICAL & SURGICAL HISTORY  HTN (hypertension)  Dyslipidemia                                              -----------------------------------------------------------  ALLERGIES:  penicillins (Other (U))                                            ------------------------------------------------------------    HOME MEDICATIONS  Home Medications:  aspirin 81 mg oral tablet: 1 tab(s) orally once a day (2022 16:20)  gabapentin 100 mg oral capsule: 1 cap(s) orally 3 times a day (2022 16:20)  metoprolol succinate 25 mg oral tablet, extended release: 1 tab(s) orally once a day (2022 16:20)  simvastatin 10 mg oral tablet: 1 tab(s) orally once a day (at bedtime) (2022 16:20)                           MEDICATIONS:  STANDING MEDICATIONS  acetaminophen     Tablet .. 650 milliGRAM(s) Oral every 6 hours  aspirin  chewable 81 milliGRAM(s) Oral daily  enoxaparin Injectable 40 milliGRAM(s) SubCutaneous every 24 hours  gabapentin 200 milliGRAM(s) Oral three times a day  ibuprofen  Tablet. 200 milliGRAM(s) Oral every 6 hours  methocarbamol 500 milliGRAM(s) Oral every 8 hours  metoprolol succinate ER 25 milliGRAM(s) Oral daily  pantoprazole    Tablet 40 milliGRAM(s) Oral before breakfast  polyethylene glycol 3350 17 Gram(s) Oral daily  senna 2 Tablet(s) Oral at bedtime  simvastatin 10 milliGRAM(s) Oral at bedtime  sodium chloride 0.9%. 1000 milliLiter(s) IV Continuous <Continuous>  tamsulosin 0.4 milliGRAM(s) Oral at bedtime    PRN MEDICATIONS  melatonin 3 milliGRAM(s) Oral at bedtime PRN  oxyCODONE    IR 5 milliGRAM(s) Oral every 6 hours PRN                                            ------------------------------------------------------------  VITAL SIGNS: Last 24 Hours  Vital Signs Last 24 Hrs  T(C): 37.1 (2022 05:15), Max: 37.1 (2022 05:15)  T(F): 98.7 (2022 05:15), Max: 98.7 (2022 05:15)  HR: 83 (2022 05:15) (66 - 83)  BP: 152/65 (2022 05:15) (121/58 - 152/65)  BP(mean): 94 (2022 05:15) (94 - 94)  RR: 18 (2022 05:15) (18 - 18)                                           --------------------------------------------------------------  LABS:        132<L>  |  96<L>  |  7<L>  ----------------------------<  84  4.0   |  26  |  <0.5<L>    Ca    8.4<L>      2022 09:17  Mg     1.9         TPro  5.9<L>  /  Alb  3.7  /  TBili  0.8  /  DBili  x   /  AST  19  /  ALT  19  /  AlkPhos  80  07-15               11.2   5.99  )-----------( 211      ( 2022 09:24 )             33.0     Urinalysis Basic - ( 2022 12:20 )  Color: Colorless / Appearance: Clear / S.007 / pH: x  Gluc: x / Ketone: Negative  / Bili: Negative / Urobili: <2 mg/dL   Blood: x / Protein: Trace / Nitrite: Negative   Leuk Esterase: Negative / RBC: x / WBC x   Sq Epi: x / Non Sq Epi: x / Bacteria: x                                              -------------------------------------------------------------  RADIOLOGY:  < from: US Kidney and Bladder (22 @ 17:58) >  IMPRESSION:    Normal evaluation of the bilateral kidneys.    Vences catheter appears within the urinary bladder.    < from: CT Abdomen and Pelvis w/ IV Cont (22 @ 14:01) >  IMPRESSION:    Markedly distended urinary bladder with mild bilateral hydroureter.    Mild to moderate compression deformity L3 vertebral body, indeterminate   chronicity.                                     --------------------------------------------------------------    PHYSICAL EXAM:  GENERAL: NAD, well-developed  HEAD:  Atraumatic, Normocephalic  EYES: EOMI, PERRLA, conjunctiva and sclera clear  ENT:No nasal obstruction or discharge. No tonsillar exudate, swelling or erythema.  NECK: Supple, No JVD  CHEST/LUNG: Clear to auscultation bilaterally; No wheeze  HEART: Regular rate and rhythm; No murmurs, rubs, or gallops  ABDOMEN: Soft, Nontender, Nondistended; Bowel sounds present  EXTREMITIES:  2+ Peripheral Pulses, No clubbing, cyanosis, or edema. 8/10 pain in R hip and R LE. No erythema   PSYCH: AAOx3  NEUROLOGY: non-focal  SKIN: No rashes or lesions, no discoloration or erythema on either foot                                         --------------------------------------------------------------    ASSESSMENT & PLAN    82 YO F w/ Pmhx of HTN, HLD and Chronic back pain (following with neurologist, Dr. Wolfe), presented for severe back pain. At baseline, patient uses a walker and cane for ambulation.    #Worsening chronic lower back pain 2/2 paraspinal muscle spasm?  #Chronic L3 compression fracture  - CT A&P shows possibly chronic L3 vertebral fracture and urinary bladder distention  - No incontinence, weakness or numbness  - Pain control: Tylenol 650mg q6, Motrin 200mg q6hrs, Gabapentin 200mg TID, Oxycodone 5mg q6 PRN  - PT/OT eval  - Per Neurology: Cervical and thoracic MRI non-con, CT R Hip with contrast pending  - Neurology appreciated hyperreflexia on exam  - Added Robaxin 500mg   - Cervical, Lumbar, and Thoracic MRI spine pending read    #Concern Urinary retention  - Mild hypogastrium tenderness, concern for retention on CT A&P  - Will straight cath, if retaining will place a vences  - RBUS: Normal eval of b/l kidneys   - Added flomax 0.4 for urinary retension    #Hyponatremia  - Na 127 on admission, no headache, no altered consciousness on admission. No baseline.  - Serum Osm 275  - f/u Urine lytes  - Monitor, if trending down, consult nephrology  - Na 135 today (7/15) - continue to monitor  - Repeat BMP at 4pm  - gentle hydration (NS 75cc/hr)     #HTN  - c/w Metoprolol    #HLD  - c/w Statin    #Dispo: Home with home PT    #Misc  -DVT prophylaxis: Lovenox  -GI prophylaxis: Protonix  -Diet: DASH  -Code status: Full code  -Activity: IAT                                                                 ----------------------------------------------------  # DVT prophylaxis: lovenox  # GI prophylaxis: PPI  # Diet: Dash/TLC  # Code status: full  # Disposition: acute

## 2022-07-16 NOTE — PROGRESS NOTE ADULT - SUBJECTIVE AND OBJECTIVE BOX
CHIEF COMPLAINT:    Patient is a 81y old  Female who presents with a chief complaint of low back pain (15 Jul 2022 13:01)      INTERVAL HPI/OVERNIGHT EVENTS:    Patient seen and examined at bedside. No acute overnight events occurred.    ROS: REports right hip pain. All other systems are negative.    Medications:  Standing  acetaminophen     Tablet .. 650 milliGRAM(s) Oral every 6 hours  aspirin  chewable 81 milliGRAM(s) Oral daily  enoxaparin Injectable 40 milliGRAM(s) SubCutaneous every 24 hours  gabapentin 200 milliGRAM(s) Oral three times a day  ibuprofen  Tablet. 200 milliGRAM(s) Oral every 6 hours  lactulose Syrup 20 Gram(s) Oral every 4 hours  methocarbamol 500 milliGRAM(s) Oral every 8 hours  metoprolol succinate ER 25 milliGRAM(s) Oral daily  pantoprazole    Tablet 40 milliGRAM(s) Oral before breakfast  polyethylene glycol 3350 17 Gram(s) Oral daily  senna 2 Tablet(s) Oral at bedtime  simvastatin 10 milliGRAM(s) Oral at bedtime  sodium chloride 0.9%. 1000 milliLiter(s) IV Continuous <Continuous>  tamsulosin 0.4 milliGRAM(s) Oral at bedtime    PRN Meds  melatonin 3 milliGRAM(s) Oral at bedtime PRN  oxyCODONE    IR 5 milliGRAM(s) Oral every 6 hours PRN        Vital Signs:    T(F): 98.7 (07-16-22 @ 05:15), Max: 98.7 (07-16-22 @ 05:15)  HR: 83 (07-16-22 @ 05:15) (66 - 83)  BP: 152/65 (07-16-22 @ 05:15) (121/58 - 152/65)  RR: 18 (07-16-22 @ 05:15) (18 - 18)  SpO2: --  I&O's Summary    15 Jul 2022 07:01  -  16 Jul 2022 07:00  --------------------------------------------------------  IN: 120 mL / OUT: 750 mL / NET: -630 mL    PHYSICAL EXAM:  GENERAL:  NAD  SKIN: No rashes or lesions  HEENT: Atraumatic. Normocephalic. Anicteric  NECK:  No JVD.   PULMONARY: Clear to ausculation bilaterally. No wheezing. No rales  CVS: Normal S1, S2. Regular rate and rhythm. No murmurs.  ABDOMEN/GI: Soft, Nontender, Nondistended; Bowel sounds are present  EXTREMITIES:  No edema B/L LE.  NEUROLOGIC:  No motor deficit.  PSYCH: Alert & oriented x 3, normal affect      LABS:                        11.2   5.99  )-----------( 211      ( 16 Jul 2022 09:24 )             33.0     07-16    132<L>  |  96<L>  |  7<L>  ----------------------------<  84  4.0   |  26  |  <0.5<L>    Ca    8.4<L>      16 Jul 2022 09:17  Mg     1.9     07-16    TPro  5.9<L>  /  Alb  3.7  /  TBili  0.8  /  DBili  x   /  AST  19  /  ALT  19  /  AlkPhos  80  07-15    PT/INR - ( 15 Jul 2022 06:42 )   PT: 13.30 sec;   INR: 1.16 ratio         PTT - ( 15 Jul 2022 06:42 )  PTT:30.9 sec          RADIOLOGY & ADDITIONAL TESTS:  Imaging or report Personally Reviewed:  [ ] YES  [ ] NO -->no new images    Consultant(s) Notes Reviewed:  [ ] YES  [ ] NO  Care Discussed with Consultants/Other Providers [ ] YES  [ ] NO    Case discussed with resident  Care discussed with pt

## 2022-07-17 ENCOUNTER — TRANSCRIPTION ENCOUNTER (OUTPATIENT)
Age: 82
End: 2022-07-17

## 2022-07-17 LAB
ALBUMIN SERPL ELPH-MCNC: 3.3 G/DL — LOW (ref 3.5–5.2)
ALP SERPL-CCNC: 85 U/L — SIGNIFICANT CHANGE UP (ref 30–115)
ALT FLD-CCNC: 20 U/L — SIGNIFICANT CHANGE UP (ref 0–41)
ANION GAP SERPL CALC-SCNC: 10 MMOL/L — SIGNIFICANT CHANGE UP (ref 7–14)
AST SERPL-CCNC: 19 U/L — SIGNIFICANT CHANGE UP (ref 0–41)
BASOPHILS # BLD AUTO: 0.02 K/UL — SIGNIFICANT CHANGE UP (ref 0–0.2)
BASOPHILS NFR BLD AUTO: 0.4 % — SIGNIFICANT CHANGE UP (ref 0–1)
BILIRUB SERPL-MCNC: 0.3 MG/DL — SIGNIFICANT CHANGE UP (ref 0.2–1.2)
BUN SERPL-MCNC: 6 MG/DL — LOW (ref 10–20)
CALCIUM SERPL-MCNC: 8.8 MG/DL — SIGNIFICANT CHANGE UP (ref 8.5–10.1)
CHLORIDE SERPL-SCNC: 99 MMOL/L — SIGNIFICANT CHANGE UP (ref 98–110)
CO2 SERPL-SCNC: 27 MMOL/L — SIGNIFICANT CHANGE UP (ref 17–32)
CREAT SERPL-MCNC: 0.5 MG/DL — LOW (ref 0.7–1.5)
EGFR: 95 ML/MIN/1.73M2 — SIGNIFICANT CHANGE UP
EOSINOPHIL # BLD AUTO: 0.18 K/UL — SIGNIFICANT CHANGE UP (ref 0–0.7)
EOSINOPHIL NFR BLD AUTO: 3.6 % — SIGNIFICANT CHANGE UP (ref 0–8)
GLUCOSE SERPL-MCNC: 90 MG/DL — SIGNIFICANT CHANGE UP (ref 70–99)
HCT VFR BLD CALC: 34.1 % — LOW (ref 37–47)
HGB BLD-MCNC: 11.9 G/DL — LOW (ref 12–16)
IMM GRANULOCYTES NFR BLD AUTO: 0.2 % — SIGNIFICANT CHANGE UP (ref 0.1–0.3)
LYMPHOCYTES # BLD AUTO: 1.28 K/UL — SIGNIFICANT CHANGE UP (ref 1.2–3.4)
LYMPHOCYTES # BLD AUTO: 25.3 % — SIGNIFICANT CHANGE UP (ref 20.5–51.1)
MAGNESIUM SERPL-MCNC: 1.9 MG/DL — SIGNIFICANT CHANGE UP (ref 1.8–2.4)
MCHC RBC-ENTMCNC: 30.5 PG — SIGNIFICANT CHANGE UP (ref 27–31)
MCHC RBC-ENTMCNC: 34.9 G/DL — SIGNIFICANT CHANGE UP (ref 32–37)
MCV RBC AUTO: 87.4 FL — SIGNIFICANT CHANGE UP (ref 81–99)
MONOCYTES # BLD AUTO: 0.54 K/UL — SIGNIFICANT CHANGE UP (ref 0.1–0.6)
MONOCYTES NFR BLD AUTO: 10.7 % — HIGH (ref 1.7–9.3)
NEUTROPHILS # BLD AUTO: 3.03 K/UL — SIGNIFICANT CHANGE UP (ref 1.4–6.5)
NEUTROPHILS NFR BLD AUTO: 59.8 % — SIGNIFICANT CHANGE UP (ref 42.2–75.2)
NRBC # BLD: 0 /100 WBCS — SIGNIFICANT CHANGE UP (ref 0–0)
PLATELET # BLD AUTO: 230 K/UL — SIGNIFICANT CHANGE UP (ref 130–400)
POTASSIUM SERPL-MCNC: 4 MMOL/L — SIGNIFICANT CHANGE UP (ref 3.5–5)
POTASSIUM SERPL-SCNC: 4 MMOL/L — SIGNIFICANT CHANGE UP (ref 3.5–5)
PROT SERPL-MCNC: 5.9 G/DL — LOW (ref 6–8)
RBC # BLD: 3.9 M/UL — LOW (ref 4.2–5.4)
RBC # FLD: 13.2 % — SIGNIFICANT CHANGE UP (ref 11.5–14.5)
SODIUM SERPL-SCNC: 136 MMOL/L — SIGNIFICANT CHANGE UP (ref 135–146)
WBC # BLD: 5.06 K/UL — SIGNIFICANT CHANGE UP (ref 4.8–10.8)
WBC # FLD AUTO: 5.06 K/UL — SIGNIFICANT CHANGE UP (ref 4.8–10.8)

## 2022-07-17 PROCEDURE — 99232 SBSQ HOSP IP/OBS MODERATE 35: CPT

## 2022-07-17 RX ORDER — SENNA PLUS 8.6 MG/1
2 TABLET ORAL
Qty: 60 | Refills: 0
Start: 2022-07-17 | End: 2022-08-15

## 2022-07-17 RX ORDER — ACETAMINOPHEN 500 MG
2 TABLET ORAL
Qty: 0 | Refills: 0 | DISCHARGE
Start: 2022-07-17

## 2022-07-17 RX ORDER — OXYCODONE HYDROCHLORIDE 5 MG/1
1 TABLET ORAL
Qty: 12 | Refills: 0
Start: 2022-07-17 | End: 2022-07-19

## 2022-07-17 RX ADMIN — Medication 200 MILLIGRAM(S): at 11:07

## 2022-07-17 RX ADMIN — Medication 200 MILLIGRAM(S): at 11:00

## 2022-07-17 RX ADMIN — Medication 650 MILLIGRAM(S): at 11:07

## 2022-07-17 RX ADMIN — PANTOPRAZOLE SODIUM 40 MILLIGRAM(S): 20 TABLET, DELAYED RELEASE ORAL at 06:04

## 2022-07-17 RX ADMIN — Medication 25 MILLIGRAM(S): at 05:13

## 2022-07-17 RX ADMIN — Medication 200 MILLIGRAM(S): at 18:31

## 2022-07-17 RX ADMIN — Medication 650 MILLIGRAM(S): at 05:11

## 2022-07-17 RX ADMIN — Medication 650 MILLIGRAM(S): at 05:19

## 2022-07-17 RX ADMIN — SIMVASTATIN 10 MILLIGRAM(S): 20 TABLET, FILM COATED ORAL at 21:13

## 2022-07-17 RX ADMIN — GABAPENTIN 200 MILLIGRAM(S): 400 CAPSULE ORAL at 21:13

## 2022-07-17 RX ADMIN — Medication 200 MILLIGRAM(S): at 05:12

## 2022-07-17 RX ADMIN — ENOXAPARIN SODIUM 40 MILLIGRAM(S): 100 INJECTION SUBCUTANEOUS at 18:32

## 2022-07-17 RX ADMIN — LACTULOSE 20 GRAM(S): 10 SOLUTION ORAL at 05:12

## 2022-07-17 RX ADMIN — METHOCARBAMOL 500 MILLIGRAM(S): 500 TABLET, FILM COATED ORAL at 14:26

## 2022-07-17 RX ADMIN — Medication 650 MILLIGRAM(S): at 11:00

## 2022-07-17 RX ADMIN — METHOCARBAMOL 500 MILLIGRAM(S): 500 TABLET, FILM COATED ORAL at 05:13

## 2022-07-17 RX ADMIN — METHOCARBAMOL 500 MILLIGRAM(S): 500 TABLET, FILM COATED ORAL at 21:13

## 2022-07-17 RX ADMIN — GABAPENTIN 200 MILLIGRAM(S): 400 CAPSULE ORAL at 14:27

## 2022-07-17 RX ADMIN — TAMSULOSIN HYDROCHLORIDE 0.4 MILLIGRAM(S): 0.4 CAPSULE ORAL at 21:13

## 2022-07-17 RX ADMIN — GABAPENTIN 200 MILLIGRAM(S): 400 CAPSULE ORAL at 05:11

## 2022-07-17 RX ADMIN — SENNA PLUS 2 TABLET(S): 8.6 TABLET ORAL at 21:13

## 2022-07-17 RX ADMIN — Medication 200 MILLIGRAM(S): at 05:19

## 2022-07-17 RX ADMIN — Medication 81 MILLIGRAM(S): at 11:07

## 2022-07-17 RX ADMIN — Medication 650 MILLIGRAM(S): at 18:31

## 2022-07-17 NOTE — DISCHARGE NOTE PROVIDER - NSDCMRMEDTOKEN_GEN_ALL_CORE_FT
acetaminophen 325 mg oral tablet: 2 tab(s) orally every 6 hours  aspirin 81 mg oral tablet: 1 tab(s) orally once a day  gabapentin 100 mg oral capsule: 1 cap(s) orally 3 times a day  metoprolol succinate 25 mg oral tablet, extended release: 1 tab(s) orally once a day  simvastatin 10 mg oral tablet: 1 tab(s) orally once a day (at bedtime)   acetaminophen 325 mg oral tablet: 2 tab(s) orally every 6 hours  aspirin 81 mg oral tablet: 1 tab(s) orally once a day  gabapentin 100 mg oral capsule: 1 cap(s) orally 3 times a day  metoprolol succinate 25 mg oral tablet, extended release: 1 tab(s) orally once a day  oxyCODONE 5 mg oral tablet: 1 tab(s) orally every 6 hours MDD:4 tabs  senna leaf extract oral tablet: 2 tab(s) orally once a day (at bedtime)  simvastatin 10 mg oral tablet: 1 tab(s) orally once a day (at bedtime)

## 2022-07-17 NOTE — DISCHARGE NOTE PROVIDER - NSDCFUSCHEDAPPT_GEN_ALL_CORE_FT
[facility]  [address] [place]  Scheduled Appointment: 08/15/2022

## 2022-07-17 NOTE — DISCHARGE NOTE PROVIDER - CARE PROVIDER_API CALL
Edgardo Vega)  Orthopaedic Surgery  [address] [place] [address]  Phone: [phone]  Fax: [phone]  Follow Up Time: 1 week

## 2022-07-17 NOTE — DISCHARGE NOTE PROVIDER - HOSPITAL COURSE
82 yo F PMHx of HTN, HLD, and chronic back pain (following with neurologist, Dr. Wolfe), presented for severe back pain. At baseline, patient uses a walker and cane for ambulation.  3 months ago patient started having lower back pain for which she saw neurologist who recommended physical therapy and gabapentin, patient did 4 weeks of physical therapy which did not help her back pain. 4 days prior to admission her lower back pain got worse, pain worse on right lower back, 8/10 intensity, dull/burning quality, radiating down the right leg.      Worsening chronic lower back pain  Chronic L3 compression fracture  -CT A&P shows possibly chronic L3 vertebral fracture and urinary bladder distention  -No incontinence, weakness or numbness  -Pain control  -PT/OT eval  -Neurology consult appreciated  -  MRI CS TS and LS read. (performed but no report yet).   Mild-moderate superior endplate compression fracture of L3 (30% height   loss), likely acute.    No evidence of cord or nerve root compression.    Mild nonspecific prevertebral soft tissue edema at C4-5.    Multilevel cervical spondylosis and spondylolisthesis, worse at C3-4   through C5-6.    Multilevel lumbar spondylosis, worse at L4-5 with moderate-severe spinal   stenosis and mild-moderate bilateral foraminal stenosis.

## 2022-07-17 NOTE — DISCHARGE NOTE PROVIDER - NSDCCPCAREPLAN_GEN_ALL_CORE_FT
PRINCIPAL DISCHARGE DIAGNOSIS  Diagnosis: Back pain  Assessment and Plan of Treatment: Your MRI showed the following:  Mild-moderate superior endplate compression fracture of L3 (30% height   loss), likely acute.  No evidence of cord or nerve root compression.  Mild nonspecific prevertebral soft tissue edema at C4-5.  Multilevel cervical spondylosis and spondylolisthesis, worse at C3-4   through C5-6.  Multilevel lumbar spondylosis, worse at L4-5 with moderate-severe spinal   stenosis and mild-moderate bilateral foraminal stenosis.  Please follow up with pain management.      SECONDARY DISCHARGE DIAGNOSES  Diagnosis: Urinary retention  Assessment and Plan of Treatment:

## 2022-07-18 ENCOUNTER — TRANSCRIPTION ENCOUNTER (OUTPATIENT)
Age: 82
End: 2022-07-18

## 2022-07-18 VITALS
RESPIRATION RATE: 18 BRPM | SYSTOLIC BLOOD PRESSURE: 126 MMHG | DIASTOLIC BLOOD PRESSURE: 58 MMHG | HEART RATE: 68 BPM | TEMPERATURE: 98 F

## 2022-07-18 LAB
ANION GAP SERPL CALC-SCNC: 10 MMOL/L — SIGNIFICANT CHANGE UP (ref 7–14)
BASOPHILS # BLD AUTO: 0.03 K/UL — SIGNIFICANT CHANGE UP (ref 0–0.2)
BASOPHILS NFR BLD AUTO: 0.6 % — SIGNIFICANT CHANGE UP (ref 0–1)
BUN SERPL-MCNC: 10 MG/DL — SIGNIFICANT CHANGE UP (ref 10–20)
CALCIUM SERPL-MCNC: 9.2 MG/DL — SIGNIFICANT CHANGE UP (ref 8.5–10.1)
CHLORIDE SERPL-SCNC: 95 MMOL/L — LOW (ref 98–110)
CO2 SERPL-SCNC: 28 MMOL/L — SIGNIFICANT CHANGE UP (ref 17–32)
CREAT SERPL-MCNC: <0.5 MG/DL — LOW (ref 0.7–1.5)
EGFR: 100 ML/MIN/1.73M2 — SIGNIFICANT CHANGE UP
EOSINOPHIL # BLD AUTO: 0.17 K/UL — SIGNIFICANT CHANGE UP (ref 0–0.7)
EOSINOPHIL NFR BLD AUTO: 3.7 % — SIGNIFICANT CHANGE UP (ref 0–8)
GLUCOSE SERPL-MCNC: 87 MG/DL — SIGNIFICANT CHANGE UP (ref 70–99)
HCT VFR BLD CALC: 34.8 % — LOW (ref 37–47)
HGB BLD-MCNC: 11.9 G/DL — LOW (ref 12–16)
IMM GRANULOCYTES NFR BLD AUTO: 0.4 % — HIGH (ref 0.1–0.3)
LYMPHOCYTES # BLD AUTO: 1.13 K/UL — LOW (ref 1.2–3.4)
LYMPHOCYTES # BLD AUTO: 24.5 % — SIGNIFICANT CHANGE UP (ref 20.5–51.1)
MAGNESIUM SERPL-MCNC: 1.9 MG/DL — SIGNIFICANT CHANGE UP (ref 1.8–2.4)
MCHC RBC-ENTMCNC: 29.9 PG — SIGNIFICANT CHANGE UP (ref 27–31)
MCHC RBC-ENTMCNC: 34.2 G/DL — SIGNIFICANT CHANGE UP (ref 32–37)
MCV RBC AUTO: 87.4 FL — SIGNIFICANT CHANGE UP (ref 81–99)
MONOCYTES # BLD AUTO: 0.55 K/UL — SIGNIFICANT CHANGE UP (ref 0.1–0.6)
MONOCYTES NFR BLD AUTO: 11.9 % — HIGH (ref 1.7–9.3)
NEUTROPHILS # BLD AUTO: 2.72 K/UL — SIGNIFICANT CHANGE UP (ref 1.4–6.5)
NEUTROPHILS NFR BLD AUTO: 58.9 % — SIGNIFICANT CHANGE UP (ref 42.2–75.2)
NRBC # BLD: 0 /100 WBCS — SIGNIFICANT CHANGE UP (ref 0–0)
PLATELET # BLD AUTO: 249 K/UL — SIGNIFICANT CHANGE UP (ref 130–400)
POTASSIUM SERPL-MCNC: 4 MMOL/L — SIGNIFICANT CHANGE UP (ref 3.5–5)
POTASSIUM SERPL-SCNC: 4 MMOL/L — SIGNIFICANT CHANGE UP (ref 3.5–5)
RBC # BLD: 3.98 M/UL — LOW (ref 4.2–5.4)
RBC # FLD: 13.3 % — SIGNIFICANT CHANGE UP (ref 11.5–14.5)
SODIUM SERPL-SCNC: 133 MMOL/L — LOW (ref 135–146)
WBC # BLD: 4.62 K/UL — LOW (ref 4.8–10.8)
WBC # FLD AUTO: 4.62 K/UL — LOW (ref 4.8–10.8)

## 2022-07-18 PROCEDURE — 99239 HOSP IP/OBS DSCHRG MGMT >30: CPT

## 2022-07-18 RX ORDER — OXYCODONE HYDROCHLORIDE 5 MG/1
2.5 TABLET ORAL ONCE
Refills: 0 | Status: DISCONTINUED | OUTPATIENT
Start: 2022-07-18 | End: 2022-07-18

## 2022-07-18 RX ADMIN — GABAPENTIN 200 MILLIGRAM(S): 400 CAPSULE ORAL at 05:38

## 2022-07-18 RX ADMIN — OXYCODONE HYDROCHLORIDE 5 MILLIGRAM(S): 5 TABLET ORAL at 08:32

## 2022-07-18 RX ADMIN — Medication 650 MILLIGRAM(S): at 06:38

## 2022-07-18 RX ADMIN — PANTOPRAZOLE SODIUM 40 MILLIGRAM(S): 20 TABLET, DELAYED RELEASE ORAL at 05:38

## 2022-07-18 RX ADMIN — Medication 200 MILLIGRAM(S): at 13:20

## 2022-07-18 RX ADMIN — Medication 650 MILLIGRAM(S): at 05:37

## 2022-07-18 RX ADMIN — METHOCARBAMOL 500 MILLIGRAM(S): 500 TABLET, FILM COATED ORAL at 14:24

## 2022-07-18 RX ADMIN — Medication 650 MILLIGRAM(S): at 13:20

## 2022-07-18 RX ADMIN — GABAPENTIN 200 MILLIGRAM(S): 400 CAPSULE ORAL at 14:23

## 2022-07-18 RX ADMIN — METHOCARBAMOL 500 MILLIGRAM(S): 500 TABLET, FILM COATED ORAL at 05:38

## 2022-07-18 RX ADMIN — OXYCODONE HYDROCHLORIDE 5 MILLIGRAM(S): 5 TABLET ORAL at 09:30

## 2022-07-18 RX ADMIN — Medication 25 MILLIGRAM(S): at 05:38

## 2022-07-18 RX ADMIN — Medication 200 MILLIGRAM(S): at 06:38

## 2022-07-18 RX ADMIN — Medication 81 MILLIGRAM(S): at 12:12

## 2022-07-18 RX ADMIN — Medication 200 MILLIGRAM(S): at 05:37

## 2022-07-18 RX ADMIN — Medication 200 MILLIGRAM(S): at 12:13

## 2022-07-18 RX ADMIN — Medication 650 MILLIGRAM(S): at 12:12

## 2022-07-18 NOTE — PROGRESS NOTE ADULT - SUBJECTIVE AND OBJECTIVE BOX
CHARLOTTE MCGINNIS 81y Female  MRN#: [id number]   CODE STATUS: Full    Hospital Day: 4d    Pt is currently admitted with the primary diagnosis of right hip/back pain    SUBJECTIVE  Hospital Course  82 YO F w/Pmhx of HTN, HLD and Chronic back pain(following with neurologist, Dr. Wolfe), presented for severe back pain. At baseline, patient uses a walker and cane for ambulation. Currently ambulation limited secondary to pain. 3 months ago patient started having lower back pain for which she saw neurologist who recommended physical therapy and gabapentin, patient did 4 weeks of physical therapy which did not help her back pain. 4 days ago lower back pain got worse, pain worse on right lower back, 8/10 intensity, dull/burning quality, radiating down the right leg. No associated urinary or fecal incontinence, lower leg weakness or numbness. Denies HA, dizziness, NVD, fever, SOB, chest pain, abdominal pain, change in urination and change in bowel habits. In the ED, pain was relatively controlled with ketorolac.    On evaluation this morning, patient appeared well and was engaged in interview. Patient reported continued chronic back pain and R foot pain, but no other complaints. No acute events overnight.    Present Today:   - Vences:  No [  ], Yes [x   ] : Indication: urinary retention and limited mobility secondary to hip pain  - Type of IV Access:       .. CVC/Piccline:  No [x  ], Yes [   ] : Indication:       .. Midline: No [ x ], Yes [   ] : Indication:                                             ----------------------------------------------------------  OBJECTIVE  PAST MEDICAL & SURGICAL HISTORY  HTN (hypertension)  Dyslipidemia                                              -----------------------------------------------------------  ALLERGIES:  penicillins (Other (U))                                            ------------------------------------------------------------    HOME MEDICATIONS  Home Medications:  aspirin 81 mg oral tablet: 1 tab(s) orally once a day (2022 16:20)  gabapentin 100 mg oral capsule: 1 cap(s) orally 3 times a day (2022 16:20)  metoprolol succinate 25 mg oral tablet, extended release: 1 tab(s) orally once a day (2022 16:20)  simvastatin 10 mg oral tablet: 1 tab(s) orally once a day (at bedtime) (2022 16:20)                           MEDICATIONS:  STANDING MEDICATIONS  acetaminophen     Tablet .. 650 milliGRAM(s) Oral every 6 hours  aspirin  chewable 81 milliGRAM(s) Oral daily  enoxaparin Injectable 40 milliGRAM(s) SubCutaneous every 24 hours  gabapentin 200 milliGRAM(s) Oral three times a day  ibuprofen  Tablet. 200 milliGRAM(s) Oral every 6 hours  methocarbamol 500 milliGRAM(s) Oral every 8 hours  metoprolol succinate ER 25 milliGRAM(s) Oral daily  pantoprazole    Tablet 40 milliGRAM(s) Oral before breakfast  polyethylene glycol 3350 17 Gram(s) Oral daily  senna 2 Tablet(s) Oral at bedtime  simvastatin 10 milliGRAM(s) Oral at bedtime  sodium chloride 0.9%. 1000 milliLiter(s) IV Continuous <Continuous>  tamsulosin 0.4 milliGRAM(s) Oral at bedtime    PRN MEDICATIONS  melatonin 3 milliGRAM(s) Oral at bedtime PRN  oxyCODONE    IR 5 milliGRAM(s) Oral every 6 hours PRN                                            ------------------------------------------------------------  VITAL SIGNS: Last 24 Hours  Vital Signs Last 24 Hrs  T(C): 36 (2022 05:16), Max: 36.3 (2022 13:19)  T(F): 96.8 (2022 05:16), Max: 97.4 (2022 13:19)  HR: 81 (2022 05:16) (66 - 81)  BP: 151/83 (2022 05:16) (137/61 - 159/71)  BP(mean): 94 (2022 21:10) (94 - 102)  RR: 18 (2022 05:16) (18 - 18)  SpO2: 98% (2022 21:10) (98% - 98%)    Parameters below as of 2022 21:10  Patient On (Oxygen Delivery Method): room air                                           --------------------------------------------------------------  LABS:        Urinalysis Basic - ( 2022 12:20 )  Color: Colorless / Appearance: Clear / S.007 / pH: x  Gluc: x / Ketone: Negative  / Bili: Negative / Urobili: <2 mg/dL   Blood: x / Protein: Trace / Nitrite: Negative   Leuk Esterase: Negative / RBC: x / WBC x   Sq Epi: x / Non Sq Epi: x / Bacteria: x                                              -------------------------------------------------------------  RADIOLOGY:  US Kidney and Bladder (22 @ 17:58) >  IMPRESSION:    Normal evaluation of the bilateral kidneys.    Vecnes catheter appears within the urinary bladder.    CT Abdomen and Pelvis w/ IV Cont (22 @ 14:01) >  IMPRESSION:    Markedly distended urinary bladder with mild bilateral hydroureter.    Mild to moderate compression deformity L3 vertebral body, indeterminate   chronicity.                                     --------------------------------------------------------------    PHYSICAL EXAM:  GENERAL: NAD, well-developed  HEAD:  Atraumatic, Normocephalic  EYES: EOMI, PERRLA, conjunctiva and sclera clear  ENT:No nasal obstruction or discharge. No tonsillar exudate, swelling or erythema.  NECK: Supple, No JVD  CHEST/LUNG: Clear to auscultation bilaterally; No wheeze  HEART: Regular rate and rhythm; No murmurs, rubs, or gallops  ABDOMEN: Soft, Nontender, Nondistended; Bowel sounds present  EXTREMITIES:  2+ Peripheral Pulses, No clubbing, cyanosis, or edema. 8/10 pain in R hip and R LE. No erythema   PSYCH: AAOx3  NEUROLOGY: non-focal  SKIN: No rashes or lesions, no discoloration or erythema on either foot                                         --------------------------------------------------------------    ASSESSMENT & PLAN    82 YO F w/ Pmhx of HTN, HLD and Chronic back pain (following with neurologist, Dr. Wolfe), presented for severe back pain. At baseline, patient uses a walker and cane for ambulation.    #Worsening chronic lower back pain 2/2 paraspinal muscle spasm?  #Chronic L3 compression fracture  - CT A&P shows possibly chronic L3 vertebral fracture and urinary bladder distention  - No incontinence, weakness or numbness  - Pain control: Tylenol 650mg q6, Motrin 200mg q6hrs, Gabapentin 200mg TID, Oxycodone 5mg q6 PRN  - PT/OT eval  - Per Neurology: Cervical and thoracic MRI non-con, CT R Hip with contrast pending  - Neurology appreciated hyperreflexia on exam  - Added Robaxin 500mg   - Cervical, Lumbar, and Thoracic MRI spine pending read    #Concern Urinary retention  - Mild hypogastrium tenderness, concern for retention on CT A&P  - Will straight cath, if retaining will place a vences  - RBUS: Normal eval of b/l kidneys   - Added flomax 0.4 for urinary retension    #Hyponatremia  - Na 127 on admission, no headache, no altered consciousness on admission. No baseline.  - Serum Osm 275  - f/u Urine lytes  - Monitor, if trending down, consult nephrology  - Na 135 today (7/15) - continue to monitor  - Repeat BMP at 4pm  - gentle hydration (NS 75cc/hr)     #HTN  - c/w Metoprolol    #HLD  - c/w Statin    #Dispo: Home with home PT    #Misc  -DVT prophylaxis: Lovenox  -GI prophylaxis: Protonix  -Diet: DASH  -Code status: Full code  -Activity: IAT                                                                 ----------------------------------------------------  # DVT prophylaxis: lovenox  # GI prophylaxis: PPI  # Diet: Dash/TLC  # Code status: full  # Disposition: acute CHARLOTTE MCGINNIS 81y Female  MRN#: [id number]   CODE STATUS: Full    Hospital Day: 4d    Pt is currently admitted with the primary diagnosis of right hip/back pain    SUBJECTIVE  Hospital Course  80 YO F w/Pmhx of HTN, HLD and Chronic back pain(following with neurologist, Dr. Wolfe), presented for severe back pain. At baseline, patient uses a walker and cane for ambulation. Currently ambulation limited secondary to pain. 3 months ago patient started having lower back pain for which she saw neurologist who recommended physical therapy and gabapentin, patient did 4 weeks of physical therapy which did not help her back pain. 4 days ago lower back pain got worse, pain worse on right lower back, 8/10 intensity, dull/burning quality, radiating down the right leg. No associated urinary or fecal incontinence, lower leg weakness or numbness. Denies HA, dizziness, NVD, fever, SOB, chest pain, abdominal pain, change in urination and change in bowel habits. In the ED, pain was relatively controlled with ketorolac.    On evaluation this morning, patient appeared well and was engaged in interview. Patient reported continued chronic back pain and R foot pain, but no other complaints. No acute events overnight.    Present Today:   - Vences:  No [  ], Yes [x   ] : Indication: urinary retention and limited mobility secondary to hip pain  - Type of IV Access:       .. CVC/Piccline:  No [x  ], Yes [   ] : Indication:       .. Midline: No [ x ], Yes [   ] : Indication:                                             ----------------------------------------------------------  OBJECTIVE  PAST MEDICAL & SURGICAL HISTORY  HTN (hypertension)  Dyslipidemia                                              -----------------------------------------------------------  ALLERGIES:  penicillins (Other (U))                                            ------------------------------------------------------------    HOME MEDICATIONS  Home Medications:  aspirin 81 mg oral tablet: 1 tab(s) orally once a day (2022 16:20)  gabapentin 100 mg oral capsule: 1 cap(s) orally 3 times a day (2022 16:20)  metoprolol succinate 25 mg oral tablet, extended release: 1 tab(s) orally once a day (2022 16:20)  simvastatin 10 mg oral tablet: 1 tab(s) orally once a day (at bedtime) (2022 16:20)                           MEDICATIONS:  STANDING MEDICATIONS  acetaminophen     Tablet .. 650 milliGRAM(s) Oral every 6 hours  aspirin  chewable 81 milliGRAM(s) Oral daily  enoxaparin Injectable 40 milliGRAM(s) SubCutaneous every 24 hours  gabapentin 200 milliGRAM(s) Oral three times a day  ibuprofen  Tablet. 200 milliGRAM(s) Oral every 6 hours  methocarbamol 500 milliGRAM(s) Oral every 8 hours  metoprolol succinate ER 25 milliGRAM(s) Oral daily  pantoprazole    Tablet 40 milliGRAM(s) Oral before breakfast  polyethylene glycol 3350 17 Gram(s) Oral daily  senna 2 Tablet(s) Oral at bedtime  simvastatin 10 milliGRAM(s) Oral at bedtime  sodium chloride 0.9%. 1000 milliLiter(s) IV Continuous <Continuous>  tamsulosin 0.4 milliGRAM(s) Oral at bedtime    PRN MEDICATIONS  melatonin 3 milliGRAM(s) Oral at bedtime PRN  oxyCODONE    IR 5 milliGRAM(s) Oral every 6 hours PRN                                            ------------------------------------------------------------  VITAL SIGNS: Last 24 Hours  Vital Signs Last 24 Hrs  Vital Signs Last 24 Hrs  T(C): 36 (2022 05:16), Max: 36.3 (2022 13:19)  T(F): 96.8 (2022 05:16), Max: 97.4 (2022 13:19)  HR: 81 (2022 05:16) (66 - 81)  BP: 151/83 (2022 05:16) (137/61 - 159/71)  BP(mean): 94 (2022 21:10) (94 - 102)  RR: 18 (2022 05:16) (18 - 18)  SpO2: 98% (2022 21:10) (98% - 98%)    Parameters below as of 2022 21:10  Patient On (Oxygen Delivery Method): room air                                           --------------------------------------------------------------  LABS:      133<L>  |  95<L>  |  10  ----------------------------<  87  4.0   |  28  |  <0.5<L>    Ca    9.2      2022 07:28  Mg     1.9         TPro  5.9<L>  /  Alb  3.3<L>  /  TBili  0.3  /  DBili  x   /  AST  19  /  ALT  20  /  AlkPhos  85                      11.9   4.62  )-----------( 249      ( 2022 07:28 )             34.8     Urinalysis Basic - ( 2022 12:20 )  Color: Colorless / Appearance: Clear / S.007 / pH: x  Gluc: x / Ketone: Negative  / Bili: Negative / Urobili: <2 mg/dL   Blood: x / Protein: Trace / Nitrite: Negative   Leuk Esterase: Negative / RBC: x / WBC x   Sq Epi: x / Non Sq Epi: x / Bacteria: x                                              -------------------------------------------------------------  RADIOLOGY:  US Kidney and Bladder (22 @ 17:58) >  IMPRESSION:    Normal evaluation of the bilateral kidneys.    Vences catheter appears within the urinary bladder.    CT Abdomen and Pelvis w/ IV Cont (22 @ 14:01) >  IMPRESSION:    Markedly distended urinary bladder with mild bilateral hydroureter.    Mild to moderate compression deformity L3 vertebral body, indeterminate   chronicity.                                     --------------------------------------------------------------    PHYSICAL EXAM:  GENERAL: NAD, well-developed  HEAD:  Atraumatic, Normocephalic  NECK: Supple, No JVD  CHEST/LUNG: Clear to auscultation bilaterally; No wheeze  HEART: Regular rate and rhythm; No murmurs, rubs, or gallops  ABDOMEN: Soft, Nontender, Nondistended; Bowel sounds present  EXTREMITIES:   No clubbing, cyanosis, or edema.  PSYCH: AAOx3  NEUROLOGY: non-focal, gait deferred, normal movement of extremities, except R ankle, limited by pain  SKIN: No rashes or lesions, no discoloration or erythema on either foot                                         --------------------------------------------------------------    ASSESSMENT & PLAN    80 YO F w/ Pmhx of HTN, HLD and Chronic back pain (following with neurologist, Dr. Wolfe), presented for severe back pain. At baseline, patient uses a walker and cane for ambulation.    #Worsening chronic lower back pain 2/2 paraspinal muscle spasm?  #Chronic L3 compression fracture  - CT A&P shows possibly chronic L3 vertebral fracture and urinary bladder distention  - No incontinence, weakness or numbness  - Pain control: Tylenol 650mg q6, Motrin 200mg q6hrs, Gabapentin 200mg TID, Oxycodone 5mg q6 PRN  - PT/OT eval  - Per Neurology: Cervical and thoracic MRI non-con, CT R Hip with contrast pending  - Neurology appreciated hyperreflexia on exam  - Added Robaxin 500mg   - Cervical, Lumbar, and Thoracic MRI spine pending read    #Concern Urinary retention  - Mild hypogastrium tenderness, concern for retention on CT A&P  - Will straight cath, if retaining will place a vences  - RBUS: Normal eval of b/l kidneys   - Added flomax 0.4 for urinary retension    #Hyponatremia  - Na 127 on admission, no headache, no altered consciousness on admission. No baseline.  - Serum Osm 275  - f/u Urine lytes  - Monitor, if trending down, consult nephrology  - Na 135 today (7/15) - continue to monitor  - Repeat BMP at 4pm  - gentle hydration (NS 75cc/hr)     #HTN  - c/w Metoprolol    #HLD  - c/w Statin    #Dispo: Home with home PT    #Misc  -DVT prophylaxis: Lovenox  -GI prophylaxis: Protonix  -Diet: DASH  -Code status: Full code  -Activity: IAT                                                                 ----------------------------------------------------  # DVT prophylaxis: lovenox  # GI prophylaxis: PPI  # Diet: Dash/TLC  # Code status: full

## 2022-07-18 NOTE — DISCHARGE NOTE NURSING/CASE MANAGEMENT/SOCIAL WORK - NSDCPEFALRISK_GEN_ALL_CORE
For information on Fall & Injury Prevention, visit: [email or web address] OR  [email or web address] OR  https://www.cdc.gov/steadi/patient.html

## 2022-07-18 NOTE — DISCHARGE NOTE NURSING/CASE MANAGEMENT/SOCIAL WORK - PATIENT PORTAL LINK FT
You can access the FollowMyHealth Patient Portal offered by [facility] by registering at the following website: [email or web address]. By joining [facility] [facility]’s FollowMyHealth portal, you will also be able to view your health information using other applications (apps) compatible with our system.

## 2022-07-22 DIAGNOSIS — M48.56XA COLLAPSED VERTEBRA, NOT ELSEWHERE CLASSIFIED, LUMBAR REGION, INITIAL ENCOUNTER FOR FRACTURE: ICD-10-CM

## 2022-07-22 DIAGNOSIS — E87.1 HYPO-OSMOLALITY AND HYPONATREMIA: ICD-10-CM

## 2022-07-22 DIAGNOSIS — K59.00 CONSTIPATION, UNSPECIFIED: ICD-10-CM

## 2022-07-22 DIAGNOSIS — E78.5 HYPERLIPIDEMIA, UNSPECIFIED: ICD-10-CM

## 2022-07-22 DIAGNOSIS — M48.02 SPINAL STENOSIS, CERVICAL REGION: ICD-10-CM

## 2022-07-22 DIAGNOSIS — M54.50 LOW BACK PAIN, UNSPECIFIED: ICD-10-CM

## 2022-07-22 DIAGNOSIS — I10 ESSENTIAL (PRIMARY) HYPERTENSION: ICD-10-CM

## 2022-07-22 DIAGNOSIS — R33.8 OTHER RETENTION OF URINE: ICD-10-CM

## 2022-07-22 DIAGNOSIS — G89.29 OTHER CHRONIC PAIN: ICD-10-CM

## 2022-07-22 DIAGNOSIS — Z79.82 LONG TERM (CURRENT) USE OF ASPIRIN: ICD-10-CM

## 2022-07-22 DIAGNOSIS — Z20.822 CONTACT WITH AND (SUSPECTED) EXPOSURE TO COVID-19: ICD-10-CM

## 2022-07-22 DIAGNOSIS — Z88.0 ALLERGY STATUS TO PENICILLIN: ICD-10-CM

## 2022-08-15 ENCOUNTER — APPOINTMENT (OUTPATIENT)
Dept: NEUROLOGY | Facility: CLINIC | Age: 82
End: 2022-08-15

## 2022-09-19 ENCOUNTER — OUTPATIENT (OUTPATIENT)
Dept: OUTPATIENT SERVICES | Facility: HOSPITAL | Age: 82
LOS: 1 days | Discharge: HOME | End: 2022-09-19

## 2022-09-19 PROBLEM — E78.5 HYPERLIPIDEMIA, UNSPECIFIED: Chronic | Status: ACTIVE | Noted: 2022-07-14

## 2022-09-19 PROBLEM — I10 ESSENTIAL (PRIMARY) HYPERTENSION: Chronic | Status: ACTIVE | Noted: 2022-07-14

## 2022-09-20 DIAGNOSIS — K02.63 DENTAL CARIES ON SMOOTH SURFACE PENETRATING INTO PULP: ICD-10-CM

## 2022-10-04 ENCOUNTER — NON-APPOINTMENT (OUTPATIENT)
Age: 82
End: 2022-10-04

## 2023-05-11 ENCOUNTER — APPOINTMENT (OUTPATIENT)
Dept: OPHTHALMOLOGY | Facility: CLINIC | Age: 83
End: 2023-05-11
Payer: MEDICARE

## 2023-05-11 ENCOUNTER — OUTPATIENT (OUTPATIENT)
Dept: OUTPATIENT SERVICES | Facility: HOSPITAL | Age: 83
LOS: 1 days | End: 2023-05-11
Payer: MEDICARE

## 2023-05-11 DIAGNOSIS — H53.8 OTHER VISUAL DISTURBANCES: ICD-10-CM

## 2023-05-11 PROCEDURE — 92014 COMPRE OPH EXAM EST PT 1/>: CPT

## 2023-05-16 DIAGNOSIS — H04.123 DRY EYE SYNDROME OF BILATERAL LACRIMAL GLANDS: ICD-10-CM

## 2023-05-16 DIAGNOSIS — H35.89 OTHER SPECIFIED RETINAL DISORDERS: ICD-10-CM

## 2023-05-16 DIAGNOSIS — H25.10 AGE-RELATED NUCLEAR CATARACT, UNSPECIFIED EYE: ICD-10-CM

## 2024-06-06 ENCOUNTER — APPOINTMENT (OUTPATIENT)
Dept: OPHTHALMOLOGY | Facility: CLINIC | Age: 84
End: 2024-06-06
Payer: MEDICARE

## 2024-06-06 ENCOUNTER — OUTPATIENT (OUTPATIENT)
Dept: OUTPATIENT SERVICES | Facility: HOSPITAL | Age: 84
LOS: 1 days | End: 2024-06-06
Payer: MEDICARE

## 2024-06-06 DIAGNOSIS — H53.8 OTHER VISUAL DISTURBANCES: ICD-10-CM

## 2024-06-06 DIAGNOSIS — H25.10 AGE-RELATED NUCLEAR CATARACT, UNSPECIFIED EYE: ICD-10-CM

## 2024-06-06 DIAGNOSIS — H04.123 DRY EYE SYNDROME OF BILATERAL LACRIMAL GLANDS: ICD-10-CM

## 2024-06-06 DIAGNOSIS — H35.89 OTHER SPECIFIED RETINAL DISORDERS: ICD-10-CM

## 2024-06-06 PROCEDURE — 99213 OFFICE O/P EST LOW 20 MIN: CPT

## 2024-06-06 PROCEDURE — 92134 CPTRZ OPH DX IMG PST SGM RTA: CPT | Mod: 26

## 2024-11-21 ENCOUNTER — APPOINTMENT (OUTPATIENT)
Dept: OPHTHALMOLOGY | Facility: CLINIC | Age: 84
End: 2024-11-21
Payer: MEDICARE

## 2024-11-21 ENCOUNTER — OUTPATIENT (OUTPATIENT)
Dept: OUTPATIENT SERVICES | Facility: HOSPITAL | Age: 84
LOS: 1 days | End: 2024-11-21
Payer: MEDICARE

## 2024-11-21 DIAGNOSIS — H53.8 OTHER VISUAL DISTURBANCES: ICD-10-CM

## 2024-11-21 PROCEDURE — 99213 OFFICE O/P EST LOW 20 MIN: CPT

## 2025-05-22 ENCOUNTER — OUTPATIENT (OUTPATIENT)
Dept: OUTPATIENT SERVICES | Facility: HOSPITAL | Age: 85
LOS: 1 days | End: 2025-05-22
Payer: MEDICARE

## 2025-05-22 ENCOUNTER — APPOINTMENT (OUTPATIENT)
Dept: OPHTHALMOLOGY | Facility: CLINIC | Age: 85
End: 2025-05-22
Payer: MEDICARE

## 2025-05-22 DIAGNOSIS — H53.8 OTHER VISUAL DISTURBANCES: ICD-10-CM

## 2025-05-22 PROCEDURE — 99213 OFFICE O/P EST LOW 20 MIN: CPT

## 2025-05-22 PROCEDURE — 92134 CPTRZ OPH DX IMG PST SGM RTA: CPT | Mod: 26

## 2025-05-22 PROCEDURE — 92134 CPTRZ OPH DX IMG PST SGM RTA: CPT

## 2025-05-29 DIAGNOSIS — H25.10 AGE-RELATED NUCLEAR CATARACT, UNSPECIFIED EYE: ICD-10-CM

## 2025-05-29 DIAGNOSIS — H04.123 DRY EYE SYNDROME OF BILATERAL LACRIMAL GLANDS: ICD-10-CM

## 2025-05-29 DIAGNOSIS — H35.89 OTHER SPECIFIED RETINAL DISORDERS: ICD-10-CM

## 2025-07-22 ENCOUNTER — APPOINTMENT (OUTPATIENT)
Age: 85
End: 2025-07-22
Payer: MEDICARE

## 2025-07-22 DIAGNOSIS — M79.675 TINEA UNGUIUM: ICD-10-CM

## 2025-07-22 DIAGNOSIS — B35.1 TINEA UNGUIUM: ICD-10-CM

## 2025-07-22 DIAGNOSIS — M79.674 TINEA UNGUIUM: ICD-10-CM

## 2025-07-22 DIAGNOSIS — M79.671 PAIN IN RIGHT FOOT: ICD-10-CM

## 2025-07-22 DIAGNOSIS — L60.2 ONYCHOGRYPHOSIS: ICD-10-CM

## 2025-07-22 DIAGNOSIS — G62.9 POLYNEUROPATHY, UNSPECIFIED: ICD-10-CM

## 2025-07-22 DIAGNOSIS — M79.672 PAIN IN LEFT FOOT: ICD-10-CM

## 2025-07-22 PROCEDURE — 11721 DEBRIDE NAIL 6 OR MORE: CPT

## 2025-07-22 PROCEDURE — 99203 OFFICE O/P NEW LOW 30 MIN: CPT | Mod: 25

## 2025-07-22 RX ORDER — CICLOPIROX 2.28 G/ML
8 SOLUTION TOPICAL
Qty: 1 | Refills: 11 | Status: ACTIVE | COMMUNITY
Start: 2025-07-22 | End: 1900-01-01

## 2025-07-22 RX ORDER — DICLOFENAC SODIUM 3 G/100G
3 GEL TOPICAL TWICE DAILY
Qty: 1 | Refills: 5 | Status: ACTIVE | COMMUNITY
Start: 2025-07-22 | End: 1900-01-01

## 2025-07-23 PROBLEM — B35.1 PAIN DUE TO ONYCHOMYCOSIS OF TOENAILS OF BOTH FEET: Status: ACTIVE | Noted: 2025-07-23

## 2025-07-23 PROBLEM — G62.9 NEUROPATHY: Status: ACTIVE | Noted: 2025-07-22

## 2025-07-23 PROBLEM — L60.2 ONYCHOGRYPHOSIS: Status: ACTIVE | Noted: 2025-07-23

## 2025-07-23 PROBLEM — B35.1 ONYCHOMYCOSIS OF TOENAIL: Status: ACTIVE | Noted: 2025-07-22

## 2025-07-23 PROBLEM — M79.672 ACUTE PAIN OF LEFT FOOT: Status: ACTIVE | Noted: 2025-07-23

## 2025-07-23 PROBLEM — M79.671 ACUTE PAIN OF RIGHT FOOT: Status: ACTIVE | Noted: 2025-07-23

## 2025-07-24 RX ORDER — CICLOPIROX 7.7 MG/G
0.77 GEL TOPICAL TWICE DAILY
Qty: 1 | Refills: 11 | Status: ACTIVE | COMMUNITY
Start: 2025-07-24 | End: 1900-01-01

## 2025-08-18 ENCOUNTER — OUTPATIENT (OUTPATIENT)
Dept: OUTPATIENT SERVICES | Facility: HOSPITAL | Age: 85
LOS: 1 days | End: 2025-08-18
Payer: MEDICARE

## 2025-08-18 ENCOUNTER — APPOINTMENT (OUTPATIENT)
Dept: OPHTHALMOLOGY | Facility: CLINIC | Age: 85
End: 2025-08-18

## 2025-08-18 PROCEDURE — 99213 OFFICE O/P EST LOW 20 MIN: CPT

## 2025-08-18 PROCEDURE — 99212 OFFICE O/P EST SF 10 MIN: CPT

## 2025-08-25 DIAGNOSIS — H04.123 DRY EYE SYNDROME OF BILATERAL LACRIMAL GLANDS: ICD-10-CM

## 2025-08-25 DIAGNOSIS — H02.409 UNSPECIFIED PTOSIS OF UNSPECIFIED EYELID: ICD-10-CM

## 2025-08-25 DIAGNOSIS — H25.10 AGE-RELATED NUCLEAR CATARACT, UNSPECIFIED EYE: ICD-10-CM

## 2025-08-25 DIAGNOSIS — H35.369 DRUSEN (DEGENERATIVE) OF MACULA, UNSPECIFIED EYE: ICD-10-CM
